# Patient Record
Sex: FEMALE | Race: BLACK OR AFRICAN AMERICAN | NOT HISPANIC OR LATINO | Employment: FULL TIME | URBAN - METROPOLITAN AREA
[De-identification: names, ages, dates, MRNs, and addresses within clinical notes are randomized per-mention and may not be internally consistent; named-entity substitution may affect disease eponyms.]

---

## 2023-04-11 ENCOUNTER — OFFICE VISIT (OUTPATIENT)
Dept: PRIMARY CARE | Facility: CLINIC | Age: 33
End: 2023-04-11
Payer: COMMERCIAL

## 2023-04-11 ENCOUNTER — LAB (OUTPATIENT)
Dept: LAB | Facility: LAB | Age: 33
End: 2023-04-11
Payer: COMMERCIAL

## 2023-04-11 VITALS — WEIGHT: 215 LBS | BODY MASS INDEX: 39.32 KG/M2 | DIASTOLIC BLOOD PRESSURE: 80 MMHG | SYSTOLIC BLOOD PRESSURE: 130 MMHG

## 2023-04-11 DIAGNOSIS — F32.A DEPRESSION, UNSPECIFIED DEPRESSION TYPE: ICD-10-CM

## 2023-04-11 DIAGNOSIS — R07.9 CHEST PAIN, UNSPECIFIED TYPE: Primary | ICD-10-CM

## 2023-04-11 DIAGNOSIS — I10 HYPERTENSION, UNSPECIFIED TYPE: ICD-10-CM

## 2023-04-11 DIAGNOSIS — I10 HYPERTENSION, UNSPECIFIED TYPE: Primary | ICD-10-CM

## 2023-04-11 LAB
ALANINE AMINOTRANSFERASE (SGPT) (U/L) IN SER/PLAS: 14 U/L (ref 7–45)
ALBUMIN (G/DL) IN SER/PLAS: 4.3 G/DL (ref 3.4–5)
ALKALINE PHOSPHATASE (U/L) IN SER/PLAS: 80 U/L (ref 33–110)
ANION GAP IN SER/PLAS: 12 MMOL/L (ref 10–20)
ASPARTATE AMINOTRANSFERASE (SGOT) (U/L) IN SER/PLAS: 15 U/L (ref 9–39)
BILIRUBIN TOTAL (MG/DL) IN SER/PLAS: 0.5 MG/DL (ref 0–1.2)
CALCIDIOL (25 OH VITAMIN D3) (NG/ML) IN SER/PLAS: 25 NG/ML
CALCIUM (MG/DL) IN SER/PLAS: 9.7 MG/DL (ref 8.6–10.6)
CARBON DIOXIDE, TOTAL (MMOL/L) IN SER/PLAS: 30 MMOL/L (ref 21–32)
CHLORIDE (MMOL/L) IN SER/PLAS: 102 MMOL/L (ref 98–107)
CHOLESTEROL (MG/DL) IN SER/PLAS: 177 MG/DL (ref 0–199)
CHOLESTEROL IN HDL (MG/DL) IN SER/PLAS: 70.2 MG/DL
CHOLESTEROL/HDL RATIO: 2.5
CREATININE (MG/DL) IN SER/PLAS: 0.79 MG/DL (ref 0.5–1.05)
ERYTHROCYTE DISTRIBUTION WIDTH (RATIO) BY AUTOMATED COUNT: 15.2 % (ref 11.5–14.5)
ERYTHROCYTE MEAN CORPUSCULAR HEMOGLOBIN CONCENTRATION (G/DL) BY AUTOMATED: 30.2 G/DL (ref 32–36)
ERYTHROCYTE MEAN CORPUSCULAR VOLUME (FL) BY AUTOMATED COUNT: 79 FL (ref 80–100)
ERYTHROCYTES (10*6/UL) IN BLOOD BY AUTOMATED COUNT: 5.21 X10E12/L (ref 4–5.2)
ESTIMATED AVERAGE GLUCOSE FOR HBA1C: 117 MG/DL
GFR FEMALE: >90 ML/MIN/1.73M2
GLUCOSE (MG/DL) IN SER/PLAS: 94 MG/DL (ref 74–99)
HEMATOCRIT (%) IN BLOOD BY AUTOMATED COUNT: 41.1 % (ref 36–46)
HEMOGLOBIN (G/DL) IN BLOOD: 12.4 G/DL (ref 12–16)
HEMOGLOBIN A1C/HEMOGLOBIN TOTAL IN BLOOD: 5.7 %
LDL: 97 MG/DL (ref 0–99)
LEUKOCYTES (10*3/UL) IN BLOOD BY AUTOMATED COUNT: 6.2 X10E9/L (ref 4.4–11.3)
NRBC (PER 100 WBCS) BY AUTOMATED COUNT: 0 /100 WBC (ref 0–0)
PLATELETS (10*3/UL) IN BLOOD AUTOMATED COUNT: 439 X10E9/L (ref 150–450)
POTASSIUM (MMOL/L) IN SER/PLAS: 3.8 MMOL/L (ref 3.5–5.3)
PROTEIN TOTAL: 7.5 G/DL (ref 6.4–8.2)
SODIUM (MMOL/L) IN SER/PLAS: 140 MMOL/L (ref 136–145)
THYROTROPIN (MIU/L) IN SER/PLAS BY DETECTION LIMIT <= 0.05 MIU/L: 0.75 MIU/L (ref 0.44–3.98)
TRIGLYCERIDE (MG/DL) IN SER/PLAS: 51 MG/DL (ref 0–149)
UREA NITROGEN (MG/DL) IN SER/PLAS: 7 MG/DL (ref 6–23)
VLDL: 10 MG/DL (ref 0–40)

## 2023-04-11 PROCEDURE — 85027 COMPLETE CBC AUTOMATED: CPT

## 2023-04-11 PROCEDURE — 80061 LIPID PANEL: CPT

## 2023-04-11 PROCEDURE — 83036 HEMOGLOBIN GLYCOSYLATED A1C: CPT

## 2023-04-11 PROCEDURE — 3075F SYST BP GE 130 - 139MM HG: CPT | Performed by: INTERNAL MEDICINE

## 2023-04-11 PROCEDURE — 99214 OFFICE O/P EST MOD 30 MIN: CPT | Performed by: INTERNAL MEDICINE

## 2023-04-11 PROCEDURE — 93000 ELECTROCARDIOGRAM COMPLETE: CPT | Performed by: INTERNAL MEDICINE

## 2023-04-11 PROCEDURE — 84443 ASSAY THYROID STIM HORMONE: CPT

## 2023-04-11 PROCEDURE — 36415 COLL VENOUS BLD VENIPUNCTURE: CPT

## 2023-04-11 PROCEDURE — 80053 COMPREHEN METABOLIC PANEL: CPT

## 2023-04-11 PROCEDURE — 3079F DIAST BP 80-89 MM HG: CPT | Performed by: INTERNAL MEDICINE

## 2023-04-11 PROCEDURE — 82306 VITAMIN D 25 HYDROXY: CPT

## 2023-04-11 ASSESSMENT — ENCOUNTER SYMPTOMS
SLEEP DISTURBANCE: 1
RESPIRATORY NEGATIVE: 1
CARDIOVASCULAR NEGATIVE: 1
GASTROINTESTINAL NEGATIVE: 1
CONSTITUTIONAL NEGATIVE: 1
HYPERTENSION: 1

## 2023-04-11 NOTE — PATIENT INSTRUCTIONS
By optimizing your health through good nutrition, daily exercise, stress management, low/moderate alcohol and avoidance of tobacco, sugar and processed foods, you can help to decrease your risk of cardiovascular disease and stroke and achieve a healthy weight. A diet rich in whole, plant-based foods such as vegetables, beans, seeds, nuts, whole grains, healthy fats and fruit - leads to lower body mass index, blood pressure, HbA1C, and cholesterol levels.

## 2023-04-11 NOTE — PROGRESS NOTES
Subjective   Patient ID: Devora Arce is a 33 y.o. female who presents for Hypertension.  Hypertension        Review of Systems   Constitutional: Negative.    Respiratory: Negative.     Cardiovascular: Negative.    Gastrointestinal: Negative.    Psychiatric/Behavioral:  Positive for sleep disturbance and suicidal ideas.        Objective   Physical Exam  Constitutional:       Appearance: She is well-developed.   Cardiovascular:      Rate and Rhythm: Normal rate and regular rhythm.      Heart sounds: Normal heart sounds. No murmur heard.  Pulmonary:      Effort: Pulmonary effort is normal.      Breath sounds: Normal breath sounds.   Abdominal:      General: Bowel sounds are normal.      Palpations: Abdomen is soft.         Assessment/Plan   Problem List Items Addressed This Visit          Circulatory    Hypertension - Primary    Relevant Orders    CBC    Comprehensive Metabolic Panel    TSH with reflex to Free T4 if abnormal    Hemoglobin A1C    Lipid Panel    Vitamin D, Total       Other    Depression    Relevant Orders    CBC    Comprehensive Metabolic Panel    TSH with reflex to Free T4 if abnormal    Hemoglobin A1C    Lipid Panel    Vitamin D, Total     HTN  Checks BP at home with wrist machine and getting random redings.  From 70 systolic to 200 systolic.  Get an arm machine and start checking it BID, get a large cuff.  She is under a lot of stress, mostly related to finances.  Her depression is poorly controlled, she will see her psychiatrist in 2 days.  No active suicidal plans.  No changes in meds at this time.  EKG showed normal sinus rhythm.  Get BW.  Follow up in 4 weeks.         Autumn Dangelo MD

## 2023-05-10 ENCOUNTER — OFFICE VISIT (OUTPATIENT)
Dept: PRIMARY CARE | Facility: CLINIC | Age: 33
End: 2023-05-10
Payer: COMMERCIAL

## 2023-05-10 VITALS
TEMPERATURE: 96.8 F | WEIGHT: 223 LBS | BODY MASS INDEX: 40.79 KG/M2 | DIASTOLIC BLOOD PRESSURE: 80 MMHG | SYSTOLIC BLOOD PRESSURE: 120 MMHG

## 2023-05-10 DIAGNOSIS — I10 HYPERTENSION, UNSPECIFIED TYPE: Primary | ICD-10-CM

## 2023-05-10 DIAGNOSIS — E03.9 PRIMARY HYPOTHYROIDISM: Primary | ICD-10-CM

## 2023-05-10 PROBLEM — R93.89 ULTRASOUND SCAN ABNORMAL: Status: ACTIVE | Noted: 2023-05-10

## 2023-05-10 PROBLEM — E01.0 THYROMEGALY: Status: ACTIVE | Noted: 2023-05-10

## 2023-05-10 PROBLEM — M54.50 LOW BACK PAIN: Status: ACTIVE | Noted: 2023-05-10

## 2023-05-10 PROBLEM — R10.30 LOWER ABDOMINAL PAIN: Status: ACTIVE | Noted: 2023-05-10

## 2023-05-10 PROBLEM — D21.9 FIBROID: Status: ACTIVE | Noted: 2023-05-10

## 2023-05-10 PROBLEM — R21 SKIN RASH: Status: ACTIVE | Noted: 2023-05-10

## 2023-05-10 PROBLEM — R19.4 CHANGE IN BOWEL HABITS: Status: ACTIVE | Noted: 2023-05-10

## 2023-05-10 PROBLEM — M25.572 LEFT ANKLE PAIN: Status: ACTIVE | Noted: 2023-05-10

## 2023-05-10 PROBLEM — R07.9 CHEST PAIN: Status: ACTIVE | Noted: 2023-05-10

## 2023-05-10 PROBLEM — R19.5 ABNORMAL STOOL COLOR: Status: ACTIVE | Noted: 2023-05-10

## 2023-05-10 PROBLEM — M25.619 SHOULDER JOINT STIFFNESS: Status: ACTIVE | Noted: 2023-05-10

## 2023-05-10 PROBLEM — B35.4 TINEA CORPORIS: Status: ACTIVE | Noted: 2023-05-10

## 2023-05-10 PROBLEM — K64.9 HEMORRHOIDS: Status: ACTIVE | Noted: 2023-05-10

## 2023-05-10 PROBLEM — E55.9 VITAMIN D DEFICIENCY: Status: ACTIVE | Noted: 2023-05-10

## 2023-05-10 PROBLEM — G56.03 BILATERAL CARPAL TUNNEL SYNDROME: Status: ACTIVE | Noted: 2023-05-10

## 2023-05-10 PROBLEM — R60.0 BILATERAL LEG EDEMA: Status: ACTIVE | Noted: 2023-05-10

## 2023-05-10 PROBLEM — F32.2 SEVERE DEPRESSION (MULTI): Status: ACTIVE | Noted: 2023-05-10

## 2023-05-10 PROBLEM — R42 VERTIGO: Status: ACTIVE | Noted: 2023-05-10

## 2023-05-10 PROBLEM — F41.8 OTHER SPECIFIED ANXIETY DISORDERS: Status: ACTIVE | Noted: 2023-05-10

## 2023-05-10 PROBLEM — N94.6 DYSMENORRHEA: Status: ACTIVE | Noted: 2023-05-10

## 2023-05-10 PROBLEM — M72.2 PLANTAR FASCIITIS: Status: ACTIVE | Noted: 2023-05-10

## 2023-05-10 PROBLEM — L25.9 CONTACT DERMATITIS: Status: ACTIVE | Noted: 2023-05-10

## 2023-05-10 PROBLEM — N85.2 ENLARGED UTERUS: Status: ACTIVE | Noted: 2023-05-10

## 2023-05-10 PROBLEM — N92.6 IRREGULAR MENSTRUAL CYCLE: Status: ACTIVE | Noted: 2023-05-10

## 2023-05-10 PROBLEM — R73.09 ELEVATED HEMOGLOBIN A1C: Status: ACTIVE | Noted: 2023-05-10

## 2023-05-10 PROBLEM — E87.6 HYPOKALEMIA: Status: ACTIVE | Noted: 2023-05-10

## 2023-05-10 PROBLEM — L72.3 SEBACEOUS CYST OF LEFT AXILLA: Status: ACTIVE | Noted: 2023-05-10

## 2023-05-10 PROBLEM — R45.851 SUICIDAL IDEATIONS: Status: ACTIVE | Noted: 2023-05-10

## 2023-05-10 PROBLEM — F43.10 STRESS DISORDER, POST TRAUMATIC: Status: ACTIVE | Noted: 2019-08-14

## 2023-05-10 PROBLEM — E66.9 OBESITY: Status: ACTIVE | Noted: 2023-05-10

## 2023-05-10 PROBLEM — N28.1 KIDNEY CYST, ACQUIRED: Status: ACTIVE | Noted: 2023-05-10

## 2023-05-10 PROBLEM — D64.9 ANEMIA: Status: ACTIVE | Noted: 2023-05-10

## 2023-05-10 PROBLEM — R19.5 LOOSE STOOLS: Status: ACTIVE | Noted: 2023-05-10

## 2023-05-10 PROBLEM — N94.3 PMS (PREMENSTRUAL SYNDROME): Status: ACTIVE | Noted: 2023-05-10

## 2023-05-10 PROBLEM — J30.9 ALLERGIC RHINITIS: Status: ACTIVE | Noted: 2023-05-10

## 2023-05-10 PROCEDURE — 3079F DIAST BP 80-89 MM HG: CPT | Performed by: INTERNAL MEDICINE

## 2023-05-10 PROCEDURE — 3074F SYST BP LT 130 MM HG: CPT | Performed by: INTERNAL MEDICINE

## 2023-05-10 PROCEDURE — 99214 OFFICE O/P EST MOD 30 MIN: CPT | Performed by: INTERNAL MEDICINE

## 2023-05-10 RX ORDER — LEVOTHYROXINE SODIUM 50 UG/1
1 TABLET ORAL DAILY
COMMUNITY
Start: 2021-06-14 | End: 2023-05-10 | Stop reason: SDUPTHER

## 2023-05-10 RX ORDER — AMLODIPINE BESYLATE 10 MG/1
10 TABLET ORAL DAILY
COMMUNITY
End: 2023-05-10 | Stop reason: SDUPTHER

## 2023-05-10 RX ORDER — LEVOTHYROXINE SODIUM 50 UG/1
TABLET ORAL
Qty: 30 TABLET | Refills: 0 | Status: SHIPPED | OUTPATIENT
Start: 2023-05-10 | End: 2023-05-10 | Stop reason: SDUPTHER

## 2023-05-10 RX ORDER — AMLODIPINE BESYLATE 10 MG/1
10 TABLET ORAL DAILY
Qty: 90 TABLET | Refills: 3 | Status: SHIPPED | OUTPATIENT
Start: 2023-05-10 | End: 2023-11-01 | Stop reason: SDUPTHER

## 2023-05-10 RX ORDER — ESCITALOPRAM OXALATE 5 MG/1
1 TABLET ORAL DAILY
COMMUNITY
Start: 2022-04-20 | End: 2023-11-01 | Stop reason: SDUPTHER

## 2023-05-10 RX ORDER — BUPROPION HYDROCHLORIDE 300 MG/1
1 TABLET ORAL DAILY
COMMUNITY
Start: 2019-05-07 | End: 2023-11-01 | Stop reason: SDUPTHER

## 2023-05-10 RX ORDER — HYDROCHLOROTHIAZIDE 12.5 MG/1
12.5 TABLET ORAL DAILY
Qty: 30 TABLET | Refills: 0 | Status: SHIPPED | OUTPATIENT
Start: 2023-05-10 | End: 2023-06-20 | Stop reason: ALTCHOICE

## 2023-05-10 RX ORDER — HYDROXYZINE HYDROCHLORIDE 25 MG/1
TABLET, FILM COATED ORAL
COMMUNITY
Start: 2022-11-08 | End: 2023-11-01 | Stop reason: SDUPTHER

## 2023-05-10 RX ORDER — LEVOTHYROXINE SODIUM 50 UG/1
50 TABLET ORAL DAILY
Qty: 90 TABLET | Refills: 3 | Status: SHIPPED | OUTPATIENT
Start: 2023-05-10 | End: 2023-11-01 | Stop reason: SDUPTHER

## 2023-05-10 ASSESSMENT — PATIENT HEALTH QUESTIONNAIRE - PHQ9
1. LITTLE INTEREST OR PLEASURE IN DOING THINGS: MORE THAN HALF THE DAYS
SUM OF ALL RESPONSES TO PHQ9 QUESTIONS 1 AND 2: 4
2. FEELING DOWN, DEPRESSED OR HOPELESS: MORE THAN HALF THE DAYS

## 2023-05-10 ASSESSMENT — ENCOUNTER SYMPTOMS: HYPERTENSION: 1

## 2023-05-10 NOTE — PROGRESS NOTES
Subjective   Patient ID: Devora Arce is a 33 y.o. female who presents for Follow-up (BP).  Hypertension            Objective   Physical Exam  Vitals reviewed.   Constitutional:       Appearance: Normal appearance. She is well-developed.   Eyes:      Extraocular Movements: Extraocular movements intact.      Conjunctiva/sclera: Conjunctivae normal.      Pupils: Pupils are equal, round, and reactive to light.   Cardiovascular:      Rate and Rhythm: Normal rate and regular rhythm.      Heart sounds: Normal heart sounds. No murmur heard.  Pulmonary:      Effort: Pulmonary effort is normal.      Breath sounds: Normal breath sounds.   Abdominal:      General: Bowel sounds are normal.      Palpations: Abdomen is soft.   Neurological:      Mental Status: She is alert.         Assessment/Plan   Problem List Items Addressed This Visit          Circulatory    Hypertension - Primary    Relevant Medications    amLODIPine (Norvasc) 10 mg tablet    levothyroxine (Synthroid, Levoxyl) 50 mcg tablet    hydroCHLOROthiazide (HYDRODiuril) 12.5 mg tablet     HTN  Checks BP at home with wrist machine and getting random redings.  From 70 systolic to 200 systolic.  Get an arm machine and start checking it BID, get a large cuff.  She is under a lot of stress, mostly related to finances.  Her depression is poorly controlled, she will see her psychiatrist in 2 days.  No active suicidal plans.  No changes in meds at this time.  EKG showed normal sinus rhythm.  Get BW.  Follow up in 4 weeks.         Blanquita Alvarenga MD

## 2023-05-10 NOTE — PROGRESS NOTES
I reviewed with the resident the medical history and the resident’s findings on physical examination.  I discussed with the resident the patient’s diagnosis and concur with the treatment plan as documented in the resident note.     I saw and evaluated the patient. I personally obtained the key and critical portions of the history and physical exam or was physically present for key and critical portions performed by the trainee. I reviewed the trainee's documentation and discussed the patient with the trainee. I agree with the trainee's medical decision making, as documented on the trainee's note.     Autumn Dangelo MD

## 2023-05-10 NOTE — PROGRESS NOTES
Chief Complaint:   Chief Complaint   Patient presents with    Follow-up     BP      Subjective     HPI    33 y.o. female with a PMH significant for hypertension, depression and anxiety, obesity presents to the clinic for elevated BP follow up. Measurements taken at home showed consistently elevated blood pressures. She denies HA, dizziness, SOB, CP or visual disturbances. She denies having started or changed  new medications and is taking amlodipine 10 mg conssitently. Recent thyroid function tests were WNL.     ROS   Review of Systems   All other systems reviewed and are negative.       Objective    Visit Vitals  /80   Temp 36 °C (96.8 °F)      BMI Readings from Last 15 Encounters:   05/10/23 40.79 kg/m²   04/11/23 39.32 kg/m²   03/06/23 39.51 kg/m²   09/14/22 40.42 kg/m²   08/12/22 40.64 kg/m²   06/15/22 41.35 kg/m²   02/28/22 41.15 kg/m²   01/14/22 41.34 kg/m²   10/08/21 40.79 kg/m²   02/19/21 39.69 kg/m²   02/03/21 40.63 kg/m²   12/10/20 40.24 kg/m²   09/24/20 40.60 kg/m²   09/01/20 40.60 kg/m²      Lab Results   Component Value Date    HGBA1C 5.7 (A) 04/11/2023      Lab Results   Component Value Date    HGBA1C 5.7 (A) 04/11/2023    HGBA1C 5.7 (A) 09/28/2022    HGBA1C 5.6 01/24/2022     Lab Results   Component Value Date    GLUF 92 08/14/2020    CREATININE 0.79 04/11/2023      Lab Results   Component Value Date    WBC 6.2 04/11/2023    HGB 12.4 04/11/2023    HCT 41.1 04/11/2023    MCV 79 (L) 04/11/2023     04/11/2023        Chemistry    Lab Results   Component Value Date/Time     04/11/2023 1503    K 3.8 04/11/2023 1503     04/11/2023 1503    CO2 30 04/11/2023 1503    BUN 7 04/11/2023 1503    CREATININE 0.79 04/11/2023 1503    Lab Results   Component Value Date/Time    CALCIUM 9.7 04/11/2023 1503    ALKPHOS 80 04/11/2023 1503    AST 15 04/11/2023 1503    ALT 14 04/11/2023 1503    BILITOT 0.5 04/11/2023 1503             Physical Exam  Physical Exam  Vitals reviewed.   Constitutional:        Appearance: Normal appearance.   Eyes:      Extraocular Movements: Extraocular movements intact.      Conjunctiva/sclera: Conjunctivae normal.      Pupils: Pupils are equal, round, and reactive to light.   Cardiovascular:      Rate and Rhythm: Normal rate and regular rhythm.   Pulmonary:      Effort: Pulmonary effort is normal.      Breath sounds: Normal breath sounds.   Abdominal:      General: Bowel sounds are normal.      Palpations: Abdomen is soft.   Neurological:      Mental Status: She is alert.          Assessment/Plan    The following medical issues were discussed during this encounter  :     # Elevated blood pressure     - BP measurements  taken at home were consistently elevated but varied in severity both  systolic and diastolic likely due to measurements taken at different times of the day or medication being taken at different times.   - Readings taken in the office were WNL, 1st reading 120/80; 2nd reading 127/80   - Added hydrochlorothiazide 12.5 to be taken in the morning  - Amlodipine 10 mg to be taken in the evening   -  Advised patient to record BP consitently and  twice a day   - Return in 6 weeks for BP check         The following labs were ordered to be completed before the patient's next visit: UTD     Please return in 6 weeks or if symptoms worsen     Blanquita Alvarenga MD

## 2023-05-10 NOTE — PATIENT INSTRUCTIONS
Please take hydrochlorothiazide 12.5 mg in the morning and amlodipine 10 mg in the evening.   Please measure BP twice a day   Follow up in 6 weeks

## 2023-06-20 ENCOUNTER — TELEMEDICINE (OUTPATIENT)
Dept: PRIMARY CARE | Facility: CLINIC | Age: 33
End: 2023-06-20
Payer: COMMERCIAL

## 2023-06-20 ENCOUNTER — APPOINTMENT (OUTPATIENT)
Dept: PRIMARY CARE | Facility: CLINIC | Age: 33
End: 2023-06-20
Payer: COMMERCIAL

## 2023-06-20 DIAGNOSIS — R73.09 ELEVATED HEMOGLOBIN A1C: ICD-10-CM

## 2023-06-20 DIAGNOSIS — F32.A DEPRESSION, UNSPECIFIED DEPRESSION TYPE: ICD-10-CM

## 2023-06-20 DIAGNOSIS — I10 BENIGN ESSENTIAL HTN: Primary | ICD-10-CM

## 2023-06-20 PROCEDURE — 99214 OFFICE O/P EST MOD 30 MIN: CPT | Performed by: INTERNAL MEDICINE

## 2023-06-20 RX ORDER — LOSARTAN POTASSIUM AND HYDROCHLOROTHIAZIDE 12.5; 5 MG/1; MG/1
1 TABLET ORAL DAILY
Qty: 30 TABLET | Refills: 3 | Status: SHIPPED | OUTPATIENT
Start: 2023-06-20 | End: 2023-08-25 | Stop reason: ALTCHOICE

## 2023-06-20 ASSESSMENT — ENCOUNTER SYMPTOMS
ORTHOPNEA: 0
SWEATS: 0
HYPERTENSION: 1
PND: 0
PALPITATIONS: 0
HEADACHES: 1
BLURRED VISION: 0
SHORTNESS OF BREATH: 0
NECK PAIN: 0

## 2023-06-20 NOTE — PROGRESS NOTES
Subjective   Patient ID: Devora Arce is a 33 y.o. female who presents for Follow-up.  Hypertension  This is a chronic problem. The current episode started more than 1 year ago. The problem has been waxing and waning since onset. The problem is resistant. Associated symptoms include anxiety and headaches. Pertinent negatives include no blurred vision, chest pain, malaise/fatigue, neck pain, orthopnea, palpitations, peripheral edema, PND, shortness of breath or sweats. Agents associated with hypertension include NSAIDs and thyroid hormones. Risk factors for coronary artery disease include obesity, sedentary lifestyle and stress. Compliance problems include diet and psychosocial issues.        Review of Systems   Constitutional:  Negative for malaise/fatigue.   Eyes:  Negative for blurred vision.   Respiratory:  Negative for shortness of breath.    Cardiovascular:  Negative for chest pain, palpitations, orthopnea and PND.   Musculoskeletal:  Negative for neck pain.   Neurological:  Positive for headaches.       Objective   Physical Exam  Neurological:      Mental Status: She is alert.   Psychiatric:         Mood and Affect: Mood normal.         Assessment/Plan   Problem List Items Addressed This Visit       Depression    Benign essential HTN - Primary    Relevant Medications    losartan-hydrochlorothiazide (Hyzaar) 50-12.5 mg tablet    Elevated hemoglobin A1c        HTN  Not well controlled  On Amlodipine and hydrochlorothiazide  Start Losartan/hydrochlorothiazide in the morning  Take Amlodipine in the evening    Treatment resistant depression  She is on ketamine infusions for it  Her BP was high before therapy and the suggested another medication    Follow up in 3 months  An interactive audio and video telecommunication system which permits real time communications between the patient (at the originating site) and provider (at the distant site) was utilized to provide this telehealth service.    Verbal consent was  requested and obtained from the patient on the day of encounter.      Autumn Dangelo MD

## 2023-08-25 ENCOUNTER — OFFICE VISIT (OUTPATIENT)
Dept: PRIMARY CARE | Facility: CLINIC | Age: 33
End: 2023-08-25
Payer: COMMERCIAL

## 2023-08-25 VITALS — SYSTOLIC BLOOD PRESSURE: 144 MMHG | WEIGHT: 226 LBS | BODY MASS INDEX: 41.34 KG/M2 | DIASTOLIC BLOOD PRESSURE: 100 MMHG

## 2023-08-25 DIAGNOSIS — I10 BENIGN ESSENTIAL HTN: Primary | ICD-10-CM

## 2023-08-25 DIAGNOSIS — L50.9 HIVES: ICD-10-CM

## 2023-08-25 PROCEDURE — 3080F DIAST BP >= 90 MM HG: CPT | Performed by: INTERNAL MEDICINE

## 2023-08-25 PROCEDURE — 99214 OFFICE O/P EST MOD 30 MIN: CPT | Performed by: INTERNAL MEDICINE

## 2023-08-25 PROCEDURE — 3077F SYST BP >= 140 MM HG: CPT | Performed by: INTERNAL MEDICINE

## 2023-08-25 RX ORDER — CHLORTHALIDONE 25 MG/1
25 TABLET ORAL DAILY
Qty: 30 TABLET | Refills: 2 | Status: SHIPPED | OUTPATIENT
Start: 2023-08-25 | End: 2023-11-01 | Stop reason: SDUPTHER

## 2023-08-25 RX ORDER — MINERAL OIL
180 ENEMA (ML) RECTAL DAILY
Qty: 30 TABLET | Refills: 0 | Status: SHIPPED | OUTPATIENT
Start: 2023-08-25 | End: 2024-08-24

## 2023-08-25 ASSESSMENT — ENCOUNTER SYMPTOMS
DIARRHEA: 0
ANOREXIA: 0
URTICARIA: 1
EYE PAIN: 0

## 2023-08-25 ASSESSMENT — PATIENT HEALTH QUESTIONNAIRE - PHQ9
2. FEELING DOWN, DEPRESSED OR HOPELESS: MORE THAN HALF THE DAYS
SUM OF ALL RESPONSES TO PHQ9 QUESTIONS 1 AND 2: 4
1. LITTLE INTEREST OR PLEASURE IN DOING THINGS: MORE THAN HALF THE DAYS

## 2023-08-25 NOTE — PROGRESS NOTES
Subjective   Patient ID: Devora Arce is a 33 y.o. female who presents for Follow-up and Hives.  Hives  This is a new problem. The current episode started more than 1 month ago. The problem has been gradually worsening since onset. The affected locations include the head and neck. The rash is characterized by dryness and itchiness. She was exposed to a new medication. Pertinent negatives include no anorexia, congestion, diarrhea, eye pain or facial edema.       Review of Systems   HENT:  Negative for congestion.    Eyes:  Negative for pain.   Gastrointestinal:  Negative for anorexia and diarrhea.       Objective   Physical Exam    There are dry patches on the base of the scalp and upper neck area  Patches are dry to touch and somewhat scaly      Assessment/Plan   Problem List Items Addressed This Visit       Benign essential HTN - Primary    Relevant Medications    chlorthalidone (Hygroton) 25 mg tablet     Other Visit Diagnoses       Hives        Relevant Medications    fexofenadine (Allegra) 180 mg tablet    Other Relevant Orders    Referral to Allergy          She may have had a reaction to Losartan  About 1 week after she started the medication, she noticed itching in her neck area  She denies using any new body products, no new shampoos, no new necklesses, no new perfumes.    She has no new animal exposure.    We will stop Losartan.  Start Chlorthalidone in am and Amlodipine at night.  Start Allegra daily.  The rash should improved in 7-14 days after medication discontinuation.  If doesn't, see an allergy specialist.           Autumn Dangelo MD

## 2023-09-29 ENCOUNTER — TELEMEDICINE (OUTPATIENT)
Dept: PRIMARY CARE | Facility: CLINIC | Age: 33
End: 2023-09-29
Payer: COMMERCIAL

## 2023-09-29 DIAGNOSIS — I10 BENIGN ESSENTIAL HTN: Primary | ICD-10-CM

## 2023-09-29 PROCEDURE — 99213 OFFICE O/P EST LOW 20 MIN: CPT | Performed by: INTERNAL MEDICINE

## 2023-09-29 ASSESSMENT — ENCOUNTER SYMPTOMS
CONSTITUTIONAL NEGATIVE: 1
RESPIRATORY NEGATIVE: 1
CARDIOVASCULAR NEGATIVE: 1
GASTROINTESTINAL NEGATIVE: 1

## 2023-09-29 NOTE — PROGRESS NOTES
Subjective   Patient ID: Devora Arce is a 33 y.o. female who presents for Follow-up.  HPI    Review of Systems   Constitutional: Negative.    Respiratory: Negative.     Cardiovascular: Negative.    Gastrointestinal: Negative.        Objective   Physical Exam  Neurological:      Mental Status: She is alert.   Psychiatric:         Mood and Affect: Mood normal.         Assessment/Plan   Problem List Items Addressed This Visit       Benign essential HTN - Primary     Hives are much better  She is seeing derm today  Hives are almost gone  She is tolerating Chlorthalidone well, no issues  Continue with current  Monitor BP at home daily  An interactive audio and video telecommunication system which permits real time communications between the patient (at the originating site) and provider (at the distant site) was utilized to provide this telehealth service.    Verbal consent was requested and obtained from the patient on the day of encounter.    Scheduled for physical next month         Autumn Dangelo MD

## 2023-10-27 ENCOUNTER — APPOINTMENT (OUTPATIENT)
Dept: PRIMARY CARE | Facility: CLINIC | Age: 33
End: 2023-10-27
Payer: COMMERCIAL

## 2023-11-01 ENCOUNTER — LAB (OUTPATIENT)
Dept: LAB | Facility: LAB | Age: 33
End: 2023-11-01
Payer: COMMERCIAL

## 2023-11-01 ENCOUNTER — OFFICE VISIT (OUTPATIENT)
Dept: PRIMARY CARE | Facility: CLINIC | Age: 33
End: 2023-11-01
Payer: COMMERCIAL

## 2023-11-01 VITALS
BODY MASS INDEX: 38.64 KG/M2 | HEIGHT: 62 IN | HEART RATE: 82 BPM | DIASTOLIC BLOOD PRESSURE: 86 MMHG | SYSTOLIC BLOOD PRESSURE: 135 MMHG | WEIGHT: 210 LBS

## 2023-11-01 DIAGNOSIS — E03.9 PRIMARY HYPOTHYROIDISM: ICD-10-CM

## 2023-11-01 DIAGNOSIS — Z11.59 ENCOUNTER FOR HEPATITIS C SCREENING TEST FOR LOW RISK PATIENT: ICD-10-CM

## 2023-11-01 DIAGNOSIS — D64.9 ANEMIA, UNSPECIFIED TYPE: ICD-10-CM

## 2023-11-01 DIAGNOSIS — E55.9 VITAMIN D DEFICIENCY: ICD-10-CM

## 2023-11-01 DIAGNOSIS — F32.9 MAJOR DEPRESSIVE DISORDER WITH CURRENT ACTIVE EPISODE, UNSPECIFIED DEPRESSION EPISODE SEVERITY, UNSPECIFIED WHETHER RECURRENT: ICD-10-CM

## 2023-11-01 DIAGNOSIS — Z23 NEED FOR TDAP VACCINATION: ICD-10-CM

## 2023-11-01 DIAGNOSIS — I10 BENIGN ESSENTIAL HTN: Primary | ICD-10-CM

## 2023-11-01 DIAGNOSIS — R73.03 PREDIABETES: ICD-10-CM

## 2023-11-01 DIAGNOSIS — I10 BENIGN ESSENTIAL HTN: ICD-10-CM

## 2023-11-01 DIAGNOSIS — Z11.4 SCREENING FOR HIV WITHOUT PRESENCE OF RISK FACTORS: ICD-10-CM

## 2023-11-01 DIAGNOSIS — F41.1 GAD (GENERALIZED ANXIETY DISORDER): ICD-10-CM

## 2023-11-01 DIAGNOSIS — Z23 NEED FOR VACCINE FOR TD (TETANUS-DIPHTHERIA): ICD-10-CM

## 2023-11-01 DIAGNOSIS — E56.9 HYPOVITAMINOSIS: ICD-10-CM

## 2023-11-01 LAB
ALBUMIN SERPL BCP-MCNC: 4 G/DL (ref 3.4–5)
ALP SERPL-CCNC: 79 U/L (ref 33–110)
ALT SERPL W P-5'-P-CCNC: 14 U/L (ref 7–45)
ANION GAP SERPL CALC-SCNC: 17 MMOL/L (ref 10–20)
AST SERPL W P-5'-P-CCNC: 15 U/L (ref 9–39)
BILIRUB SERPL-MCNC: 0.3 MG/DL (ref 0–1.2)
BUN SERPL-MCNC: 17 MG/DL (ref 6–23)
CALCIUM SERPL-MCNC: 9.7 MG/DL (ref 8.6–10.6)
CHLORIDE SERPL-SCNC: 99 MMOL/L (ref 98–107)
CO2 SERPL-SCNC: 28 MMOL/L (ref 21–32)
CREAT SERPL-MCNC: 1.26 MG/DL (ref 0.5–1.05)
FERRITIN SERPL-MCNC: 50 NG/ML (ref 8–150)
GFR SERPL CREATININE-BSD FRML MDRD: 59 ML/MIN/1.73M*2
GLUCOSE SERPL-MCNC: 87 MG/DL (ref 74–99)
IRON SATN MFR SERPL: 9 % (ref 25–45)
IRON SERPL-MCNC: 31 UG/DL (ref 35–150)
POTASSIUM SERPL-SCNC: 3.4 MMOL/L (ref 3.5–5.3)
PROT SERPL-MCNC: 7.5 G/DL (ref 6.4–8.2)
SODIUM SERPL-SCNC: 141 MMOL/L (ref 136–145)
TIBC SERPL-MCNC: 361 UG/DL (ref 240–445)
UIBC SERPL-MCNC: 330 UG/DL (ref 110–370)

## 2023-11-01 PROCEDURE — 90471 IMMUNIZATION ADMIN: CPT | Performed by: INTERNAL MEDICINE

## 2023-11-01 PROCEDURE — 85027 COMPLETE CBC AUTOMATED: CPT

## 2023-11-01 PROCEDURE — 82043 UR ALBUMIN QUANTITATIVE: CPT

## 2023-11-01 PROCEDURE — 82306 VITAMIN D 25 HYDROXY: CPT

## 2023-11-01 PROCEDURE — 3079F DIAST BP 80-89 MM HG: CPT | Performed by: INTERNAL MEDICINE

## 2023-11-01 PROCEDURE — 83540 ASSAY OF IRON: CPT

## 2023-11-01 PROCEDURE — 87389 HIV-1 AG W/HIV-1&-2 AB AG IA: CPT

## 2023-11-01 PROCEDURE — 99395 PREV VISIT EST AGE 18-39: CPT | Performed by: INTERNAL MEDICINE

## 2023-11-01 PROCEDURE — 3075F SYST BP GE 130 - 139MM HG: CPT | Performed by: INTERNAL MEDICINE

## 2023-11-01 PROCEDURE — 36415 COLL VENOUS BLD VENIPUNCTURE: CPT

## 2023-11-01 PROCEDURE — 82607 VITAMIN B-12: CPT

## 2023-11-01 PROCEDURE — 90715 TDAP VACCINE 7 YRS/> IM: CPT | Performed by: INTERNAL MEDICINE

## 2023-11-01 PROCEDURE — 83036 HEMOGLOBIN GLYCOSYLATED A1C: CPT

## 2023-11-01 PROCEDURE — 80053 COMPREHEN METABOLIC PANEL: CPT

## 2023-11-01 PROCEDURE — 82570 ASSAY OF URINE CREATININE: CPT

## 2023-11-01 PROCEDURE — 86803 HEPATITIS C AB TEST: CPT

## 2023-11-01 PROCEDURE — 83550 IRON BINDING TEST: CPT

## 2023-11-01 PROCEDURE — 82728 ASSAY OF FERRITIN: CPT

## 2023-11-01 RX ORDER — ESCITALOPRAM OXALATE 5 MG/1
5 TABLET ORAL DAILY
Qty: 90 TABLET | Refills: 3 | Status: SHIPPED | OUTPATIENT
Start: 2023-11-01

## 2023-11-01 RX ORDER — AMLODIPINE BESYLATE 10 MG/1
10 TABLET ORAL DAILY
Qty: 90 TABLET | Refills: 3 | Status: SHIPPED | OUTPATIENT
Start: 2023-11-01 | End: 2024-06-04 | Stop reason: SDUPTHER

## 2023-11-01 RX ORDER — HYDROXYZINE HYDROCHLORIDE 25 MG/1
25 TABLET, FILM COATED ORAL DAILY PRN
Qty: 90 TABLET | Refills: 3 | Status: SHIPPED | OUTPATIENT
Start: 2023-11-01

## 2023-11-01 RX ORDER — CHLORTHALIDONE 25 MG/1
25 TABLET ORAL DAILY
Qty: 90 TABLET | Refills: 3 | Status: SHIPPED | OUTPATIENT
Start: 2023-11-01 | End: 2024-06-04 | Stop reason: SDUPTHER

## 2023-11-01 RX ORDER — BUPROPION HYDROCHLORIDE 300 MG/1
300 TABLET ORAL DAILY
Qty: 90 TABLET | Refills: 3 | Status: SHIPPED | OUTPATIENT
Start: 2023-11-01

## 2023-11-01 RX ORDER — LEVOTHYROXINE SODIUM 50 UG/1
50 TABLET ORAL DAILY
Qty: 90 TABLET | Refills: 3 | Status: SHIPPED | OUTPATIENT
Start: 2023-11-01 | End: 2024-06-04 | Stop reason: SDUPTHER

## 2023-11-01 ASSESSMENT — ENCOUNTER SYMPTOMS
PALPITATIONS: 0
APPETITE CHANGE: 0
CONSTIPATION: 0
JOINT SWELLING: 1
MYALGIAS: 0
SINUS PRESSURE: 0
HEADACHES: 0
FATIGUE: 1
CHEST TIGHTNESS: 0
SHORTNESS OF BREATH: 0
CHOKING: 0
LIGHT-HEADEDNESS: 0
DIARRHEA: 0
COUGH: 0
FREQUENCY: 0
BLOOD IN STOOL: 0
DYSURIA: 0
SINUS PAIN: 0
ABDOMINAL DISTENTION: 0
BACK PAIN: 0
ACTIVITY CHANGE: 0
NUMBNESS: 0
RHINORRHEA: 1

## 2023-11-01 ASSESSMENT — PATIENT HEALTH QUESTIONNAIRE - PHQ9
SUM OF ALL RESPONSES TO PHQ9 QUESTIONS 1 AND 2: 4
1. LITTLE INTEREST OR PLEASURE IN DOING THINGS: MORE THAN HALF THE DAYS
2. FEELING DOWN, DEPRESSED OR HOPELESS: MORE THAN HALF THE DAYS

## 2023-11-01 NOTE — PROGRESS NOTES
I saw and evaluated the patient. I personally obtained the key and critical portions of the history and physical exam or was physically present for key and critical portions performed by the resident/fellow. I reviewed the resident/fellow's documentation and discussed the patient with the resident/fellow. I agree with the resident/fellow's medical decision making as documented in the note.    Autumn Dangelo MD

## 2023-11-01 NOTE — PROGRESS NOTES
Chief Complaint:   Chief Complaint   Patient presents with    Annual Exam      Subjective     HPI    33 y.o. female with a PMH significant for HTN, allergic rhinitis, hypothyroidism, vit D deficiency, MDD/RACHELL, carpal tunnel syndrome presents to the clinic for annual physical exam. No acute r active complaints otherwise    Shx: Denies smoking, denies drinking, uses CBD gummies to help with anxiety    ROS   Review of Systems   Constitutional:  Positive for fatigue. Negative for activity change and appetite change.   HENT:  Positive for postnasal drip and rhinorrhea. Negative for sinus pressure and sinus pain.    Respiratory:  Negative for cough, choking, chest tightness and shortness of breath.    Cardiovascular:  Positive for chest pain and leg swelling (Left ankle more swollen than right). Negative for palpitations.   Gastrointestinal:  Negative for abdominal distention, blood in stool, constipation and diarrhea.        Twice a day BM   Genitourinary:  Negative for dysuria, frequency and urgency.   Musculoskeletal:  Positive for joint swelling (left ankle). Negative for back pain and myalgias.   Neurological:  Negative for light-headedness, numbness and headaches.        Objective    Visit Vitals  /86   Pulse 82      BMI Readings from Last 15 Encounters:   11/01/23 38.41 kg/m²   08/25/23 41.34 kg/m²   05/10/23 40.79 kg/m²   05/02/23 39.14 kg/m²   04/11/23 39.32 kg/m²   03/06/23 39.51 kg/m²   09/14/22 40.42 kg/m²   08/12/22 40.64 kg/m²   06/15/22 41.35 kg/m²   02/28/22 41.15 kg/m²   01/14/22 41.34 kg/m²   10/08/21 40.79 kg/m²   02/19/21 39.69 kg/m²   02/03/21 40.63 kg/m²   01/22/21 41.05 kg/m²      Lab Results   Component Value Date    HGBA1C 5.7 (A) 04/11/2023      Lab Results   Component Value Date    HGBA1C 5.7 (A) 04/11/2023    HGBA1C 5.7 (A) 09/28/2022    HGBA1C 5.6 01/24/2022     Lab Results   Component Value Date    GLUF 92 08/14/2020    CREATININE 0.79 04/11/2023      Lab Results   Component Value Date     WBC 6.2 04/11/2023    HGB 12.4 04/11/2023    HCT 41.1 04/11/2023    MCV 79 (L) 04/11/2023     04/11/2023        Chemistry    Lab Results   Component Value Date/Time     04/11/2023 1503    K 3.8 04/11/2023 1503     04/11/2023 1503    CO2 30 04/11/2023 1503    BUN 7 04/11/2023 1503    CREATININE 0.79 04/11/2023 1503    Lab Results   Component Value Date/Time    CALCIUM 9.7 04/11/2023 1503    ALKPHOS 80 04/11/2023 1503    AST 15 04/11/2023 1503    ALT 14 04/11/2023 1503    BILITOT 0.5 04/11/2023 1503             Physical Exam  Physical Exam     Assessment/Plan    The following medical issues were discussed during this encounter: Devora was seen today for annual exam.    # Benign essential HTN (Primary)  -    Was started on losartan, patient developed hives from it  -     Was started on chlorthalidone, BP today was 135/86 mmHg  -     Ordered Comprehensive metabolic panel; Future  -     Ordered Albumin, urine, random; Future  -     Refilled amLODIPine (Norvasc) 10 mg tablet; Take 1 tablet (10 mg) by mouth once daily.  -     Refilled chlorthalidone (Hygroton) 25 mg tablet; Take 1 tablet (25 mg) by mouth once daily.    # Prediabetes  -    Last HbA1c 5.7; prediabetic  -     Ordered Hemoglobin A1c; Future    # Anemia, unspecified type  -     Endorses fatigue, last CBC remarkable for low MCV, MCHC and RDW. H/H WNL.  -    Ordered CBC; Future  -    Ordered Iron and TIBC; Future  -    Ordered Ferritin; Future    # Vitamin D deficiency  -   Last Vit D levels on 04/11/2023 were low 25. Patient was started on OTC Vit D 2000 U  -   Ordered Vitamin D 25-Hydroxy,Total (for eval of Vitamin D levels); Future    # Hypothyroidism:   -    Last TSH from 04/11/2023 0.75 (WNL)  -    Refilled levothyroxine (Synthroid, Levoxyl) 50 mcg tablet; Take 1 tablet (50 mcg) by mouth once daily.    # Major depressive disorder  # RACHELL (generalized anxiety disorder)  -     Patient on monthly ketamine infusions. Last infusion  administered on Monday 10/30  -    Patient endorses feeling better, has a behavioral woo provider tat she follows up with  -    Refilled buPROPion XL (Wellbutrin XL) 300 mg 24 hr tablet; Take 1 tablet (300 mg) by mouth once daily.  -    Refilled escitalopram (Lexapro) 5 mg tablet; Take 1 tablet (5 mg) by mouth once daily.  -    Refilled hydrOXYzine HCL (Atarax) 25 mg tablet; Take 1 tablet (25 mg) by mouth once daily as needed for anxiety.     Health maintenance  The following screening tests were ordered:  - Vitamin B12 levels  - HIV-1 and HIV-2 antibodies; Future  - Hepatitis C antibody; Future    Immunizations:  - Tdap administered today on 11/01/2023     Please return in 6 months or if symptoms worsen     Apolinar Valdez MD  Internal Medicine, PGY- 1  11/01/23 at 11:13 AM     Disclaimer: Documentation completed with the information available at the time of input. The times in the chart may not be reflective of actual patient care times, interventions, or procedures. Documentation occurs after the physical care of the patient.

## 2023-11-02 LAB
25(OH)D3 SERPL-MCNC: 26 NG/ML (ref 30–100)
CREAT UR-MCNC: 323.4 MG/DL (ref 20–320)
ERYTHROCYTE [DISTWIDTH] IN BLOOD BY AUTOMATED COUNT: 14.7 % (ref 11.5–14.5)
EST. AVERAGE GLUCOSE BLD GHB EST-MCNC: 111 MG/DL
HBA1C MFR BLD: 5.5 %
HCT VFR BLD AUTO: 39.8 % (ref 36–46)
HCV AB SER QL: NONREACTIVE
HGB BLD-MCNC: 11.9 G/DL (ref 12–16)
HIV 1+2 AB+HIV1 P24 AG SERPL QL IA: NONREACTIVE
MCH RBC QN AUTO: 24.9 PG (ref 26–34)
MCHC RBC AUTO-ENTMCNC: 29.9 G/DL (ref 32–36)
MCV RBC AUTO: 83 FL (ref 80–100)
MICROALBUMIN UR-MCNC: 16.7 MG/L
MICROALBUMIN/CREAT UR: 5.2 UG/MG CREAT
NRBC BLD-RTO: 0 /100 WBCS (ref 0–0)
PLATELET # BLD AUTO: 484 X10*3/UL (ref 150–450)
RBC # BLD AUTO: 4.78 X10*6/UL (ref 4–5.2)
VIT B12 SERPL-MCNC: 770 PG/ML (ref 211–911)
WBC # BLD AUTO: 5.6 X10*3/UL (ref 4.4–11.3)

## 2023-11-03 DIAGNOSIS — E87.6 HYPOKALEMIA: ICD-10-CM

## 2023-11-03 DIAGNOSIS — N17.9 AKI (ACUTE KIDNEY INJURY) (CMS-HCC): ICD-10-CM

## 2023-11-03 DIAGNOSIS — D50.0 IRON DEFICIENCY ANEMIA DUE TO CHRONIC BLOOD LOSS: ICD-10-CM

## 2023-11-03 DIAGNOSIS — E55.9 VITAMIN D DEFICIENCY: Primary | ICD-10-CM

## 2023-11-03 RX ORDER — POTASSIUM CHLORIDE 20 MEQ/1
20 TABLET, EXTENDED RELEASE ORAL DAILY
Qty: 2 TABLET | Refills: 0 | Status: SHIPPED | OUTPATIENT
Start: 2023-11-03 | End: 2023-11-05

## 2023-11-03 RX ORDER — FERROUS SULFATE 325(65) MG
325 TABLET, DELAYED RELEASE (ENTERIC COATED) ORAL
Qty: 90 TABLET | Refills: 1 | Status: SHIPPED | OUTPATIENT
Start: 2023-11-03 | End: 2024-11-02

## 2023-11-03 RX ORDER — ERGOCALCIFEROL 1.25 MG/1
50000 CAPSULE ORAL
Qty: 8 CAPSULE | Refills: 0 | Status: SHIPPED | OUTPATIENT
Start: 2023-11-03 | End: 2023-12-29

## 2023-11-07 ENCOUNTER — TELEPHONE (OUTPATIENT)
Dept: ALLERGY | Facility: CLINIC | Age: 33
End: 2023-11-07
Payer: COMMERCIAL

## 2023-11-13 ENCOUNTER — CLINICAL SUPPORT (OUTPATIENT)
Dept: ALLERGY | Facility: CLINIC | Age: 33
End: 2023-11-13
Payer: COMMERCIAL

## 2023-11-13 VITALS
DIASTOLIC BLOOD PRESSURE: 86 MMHG | BODY MASS INDEX: 39.2 KG/M2 | SYSTOLIC BLOOD PRESSURE: 130 MMHG | WEIGHT: 213 LBS | HEART RATE: 84 BPM | HEIGHT: 62 IN

## 2023-11-13 DIAGNOSIS — L23.89 ALLERGIC CONTACT DERMATITIS DUE TO OTHER AGENTS: Primary | ICD-10-CM

## 2023-11-13 DIAGNOSIS — R21 RASH: ICD-10-CM

## 2023-11-13 PROCEDURE — 99214 OFFICE O/P EST MOD 30 MIN: CPT | Performed by: ALLERGY & IMMUNOLOGY

## 2023-11-13 ASSESSMENT — PAIN SCALES - GENERAL: PAINLEVEL: 0-NO PAIN
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
## 2024-01-23 ENCOUNTER — APPOINTMENT (OUTPATIENT)
Dept: ALLERGY | Facility: CLINIC | Age: 34
End: 2024-01-23
Payer: COMMERCIAL

## 2024-01-25 ENCOUNTER — APPOINTMENT (OUTPATIENT)
Dept: ALLERGY | Facility: CLINIC | Age: 34
End: 2024-01-25
Payer: COMMERCIAL

## 2024-01-26 ENCOUNTER — APPOINTMENT (OUTPATIENT)
Dept: ALLERGY | Facility: CLINIC | Age: 34
End: 2024-01-26
Payer: COMMERCIAL

## 2024-02-07 ENCOUNTER — LAB (OUTPATIENT)
Dept: LAB | Facility: LAB | Age: 34
End: 2024-02-07
Payer: COMMERCIAL

## 2024-02-07 ENCOUNTER — OFFICE VISIT (OUTPATIENT)
Dept: PRIMARY CARE | Facility: CLINIC | Age: 34
End: 2024-02-07
Payer: COMMERCIAL

## 2024-02-07 VITALS
WEIGHT: 214 LBS | DIASTOLIC BLOOD PRESSURE: 84 MMHG | SYSTOLIC BLOOD PRESSURE: 120 MMHG | BODY MASS INDEX: 39.38 KG/M2 | HEIGHT: 62 IN

## 2024-02-07 DIAGNOSIS — I10 BENIGN ESSENTIAL HTN: Primary | ICD-10-CM

## 2024-02-07 DIAGNOSIS — F41.1 GAD (GENERALIZED ANXIETY DISORDER): ICD-10-CM

## 2024-02-07 DIAGNOSIS — R11.0 NAUSEA: ICD-10-CM

## 2024-02-07 DIAGNOSIS — F51.05 INSOMNIA DUE TO MENTAL CONDITION: ICD-10-CM

## 2024-02-07 DIAGNOSIS — F32.A DEPRESSION, UNSPECIFIED DEPRESSION TYPE: ICD-10-CM

## 2024-02-07 DIAGNOSIS — I10 BENIGN ESSENTIAL HTN: ICD-10-CM

## 2024-02-07 LAB
ALBUMIN SERPL BCP-MCNC: 4 G/DL (ref 3.4–5)
ALP SERPL-CCNC: 64 U/L (ref 33–110)
ALT SERPL W P-5'-P-CCNC: 13 U/L (ref 7–45)
ANION GAP SERPL CALC-SCNC: 15 MMOL/L (ref 10–20)
AST SERPL W P-5'-P-CCNC: 14 U/L (ref 9–39)
BILIRUB SERPL-MCNC: 0.3 MG/DL (ref 0–1.2)
BUN SERPL-MCNC: 20 MG/DL (ref 6–23)
CALCIUM SERPL-MCNC: 9.9 MG/DL (ref 8.6–10.6)
CHLORIDE SERPL-SCNC: 100 MMOL/L (ref 98–107)
CO2 SERPL-SCNC: 30 MMOL/L (ref 21–32)
CREAT SERPL-MCNC: 0.93 MG/DL (ref 0.5–1.05)
EGFRCR SERPLBLD CKD-EPI 2021: 84 ML/MIN/1.73M*2
GLUCOSE SERPL-MCNC: 87 MG/DL (ref 74–99)
POTASSIUM SERPL-SCNC: 3.6 MMOL/L (ref 3.5–5.3)
PROT SERPL-MCNC: 6.7 G/DL (ref 6.4–8.2)
SODIUM SERPL-SCNC: 141 MMOL/L (ref 136–145)

## 2024-02-07 PROCEDURE — 80053 COMPREHEN METABOLIC PANEL: CPT

## 2024-02-07 PROCEDURE — 99213 OFFICE O/P EST LOW 20 MIN: CPT | Performed by: INTERNAL MEDICINE

## 2024-02-07 PROCEDURE — 3074F SYST BP LT 130 MM HG: CPT | Performed by: INTERNAL MEDICINE

## 2024-02-07 PROCEDURE — 36415 COLL VENOUS BLD VENIPUNCTURE: CPT

## 2024-02-07 PROCEDURE — 1036F TOBACCO NON-USER: CPT | Performed by: INTERNAL MEDICINE

## 2024-02-07 PROCEDURE — 3079F DIAST BP 80-89 MM HG: CPT | Performed by: INTERNAL MEDICINE

## 2024-02-07 RX ORDER — CLOBETASOL PROPIONATE 0.5 MG/G
OINTMENT TOPICAL 2 TIMES DAILY
COMMUNITY
Start: 2023-10-27

## 2024-02-07 RX ORDER — CLONIDINE HYDROCHLORIDE 0.1 MG/1
0.1 TABLET ORAL 2 TIMES DAILY
COMMUNITY
Start: 2024-01-30

## 2024-02-07 RX ORDER — DEXTROMETHORPHAN HYDROBROMIDE, BUPROPION HYDROCHLORIDE 105; 45 MG/1; MG/1
1 TABLET, MULTILAYER, EXTENDED RELEASE ORAL DAILY
COMMUNITY
Start: 2024-01-25

## 2024-02-07 ASSESSMENT — PATIENT HEALTH QUESTIONNAIRE - PHQ9
1. LITTLE INTEREST OR PLEASURE IN DOING THINGS: NOT AT ALL
2. FEELING DOWN, DEPRESSED OR HOPELESS: SEVERAL DAYS
10. IF YOU CHECKED OFF ANY PROBLEMS, HOW DIFFICULT HAVE THESE PROBLEMS MADE IT FOR YOU TO DO YOUR WORK, TAKE CARE OF THINGS AT HOME, OR GET ALONG WITH OTHER PEOPLE: VERY DIFFICULT
SUM OF ALL RESPONSES TO PHQ9 QUESTIONS 1 AND 2: 1

## 2024-02-07 ASSESSMENT — ENCOUNTER SYMPTOMS
CONSTITUTIONAL NEGATIVE: 1
GASTROINTESTINAL NEGATIVE: 1
RESPIRATORY NEGATIVE: 1
CARDIOVASCULAR NEGATIVE: 1

## 2024-02-07 ASSESSMENT — LIFESTYLE VARIABLES: HOW MANY STANDARD DRINKS CONTAINING ALCOHOL DO YOU HAVE ON A TYPICAL DAY: PATIENT DOES NOT DRINK
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
## 2024-02-07 NOTE — PATIENT INSTRUCTIONS
It was nice to meet you today.    Continue counseling and follow up with psychiatry as planned.    Please complete blood work.     Call the office if symptoms persist or worsen.     Follow up as scheduled in March.

## 2024-02-15 PROBLEM — R11.0 NAUSEA: Status: ACTIVE | Noted: 2024-02-15

## 2024-02-15 PROBLEM — F41.1 GAD (GENERALIZED ANXIETY DISORDER): Status: ACTIVE | Noted: 2024-02-15

## 2024-02-15 PROBLEM — F51.05 INSOMNIA DUE TO MENTAL CONDITION: Status: ACTIVE | Noted: 2024-02-15

## 2024-02-17 PROCEDURE — 87635 SARS-COV-2 COVID-19 AMP PRB: CPT

## 2024-02-18 ENCOUNTER — LAB REQUISITION (OUTPATIENT)
Dept: LAB | Facility: HOSPITAL | Age: 34
End: 2024-02-18
Payer: COMMERCIAL

## 2024-02-18 LAB — SARS-COV-2 RNA RESP QL NAA+PROBE: DETECTED

## 2024-03-16 ENCOUNTER — E-VISIT (OUTPATIENT)
Dept: PRIMARY CARE | Facility: CLINIC | Age: 34
End: 2024-03-16
Payer: COMMERCIAL

## 2024-03-16 DIAGNOSIS — I10 BENIGN ESSENTIAL HTN: Primary | ICD-10-CM

## 2024-03-16 PROCEDURE — 99421 OL DIG E/M SVC 5-10 MIN: CPT | Performed by: INTERNAL MEDICINE

## 2024-03-16 RX ORDER — LOSARTAN POTASSIUM 50 MG/1
50 TABLET ORAL DAILY
Qty: 30 TABLET | Refills: 2 | Status: SHIPPED | OUTPATIENT
Start: 2024-03-16 | End: 2024-03-17

## 2024-03-17 DIAGNOSIS — I10 BENIGN ESSENTIAL HTN: Primary | ICD-10-CM

## 2024-03-17 RX ORDER — NEBIVOLOL 5 MG/1
5 TABLET ORAL DAILY
Qty: 30 TABLET | Refills: 2 | Status: SHIPPED | OUTPATIENT
Start: 2024-03-17 | End: 2024-06-04 | Stop reason: ALTCHOICE

## 2024-03-17 NOTE — TELEPHONE ENCOUNTER
We will start Bystolic 5 mg daily  The patient had an allergic reaction to Losartan in the past.  Time spent was 6 minutes for this encounter, including chart review, medication review, communication with the patient and addressing the concern.

## 2024-03-27 ENCOUNTER — OFFICE VISIT (OUTPATIENT)
Dept: PRIMARY CARE | Facility: CLINIC | Age: 34
End: 2024-03-27
Payer: COMMERCIAL

## 2024-03-27 VITALS — DIASTOLIC BLOOD PRESSURE: 80 MMHG | WEIGHT: 210 LBS | SYSTOLIC BLOOD PRESSURE: 130 MMHG | BODY MASS INDEX: 38.41 KG/M2

## 2024-03-27 DIAGNOSIS — Z00.00 ROUTINE GENERAL MEDICAL EXAMINATION AT A HEALTH CARE FACILITY: ICD-10-CM

## 2024-03-27 DIAGNOSIS — R42 VERTIGO: ICD-10-CM

## 2024-03-27 DIAGNOSIS — E56.9 HYPOVITAMINOSIS: ICD-10-CM

## 2024-03-27 DIAGNOSIS — D64.9 ANEMIA, UNSPECIFIED TYPE: ICD-10-CM

## 2024-03-27 DIAGNOSIS — R73.09 ELEVATED HEMOGLOBIN A1C: ICD-10-CM

## 2024-03-27 DIAGNOSIS — F32.A DEPRESSION, UNSPECIFIED DEPRESSION TYPE: ICD-10-CM

## 2024-03-27 DIAGNOSIS — E03.9 PRIMARY HYPOTHYROIDISM: ICD-10-CM

## 2024-03-27 DIAGNOSIS — S99.921A FOOT INJURY, RIGHT, INITIAL ENCOUNTER: ICD-10-CM

## 2024-03-27 DIAGNOSIS — I10 BENIGN ESSENTIAL HTN: Primary | ICD-10-CM

## 2024-03-27 PROCEDURE — 3075F SYST BP GE 130 - 139MM HG: CPT | Performed by: INTERNAL MEDICINE

## 2024-03-27 PROCEDURE — 3079F DIAST BP 80-89 MM HG: CPT | Performed by: INTERNAL MEDICINE

## 2024-03-27 PROCEDURE — 1036F TOBACCO NON-USER: CPT | Performed by: INTERNAL MEDICINE

## 2024-03-27 PROCEDURE — 99214 OFFICE O/P EST MOD 30 MIN: CPT | Performed by: INTERNAL MEDICINE

## 2024-03-27 RX ORDER — MECLIZINE HYDROCHLORIDE 25 MG/1
25 TABLET ORAL 3 TIMES DAILY PRN
Qty: 30 TABLET | Refills: 11 | Status: CANCELLED | OUTPATIENT
Start: 2024-03-27 | End: 2025-03-27

## 2024-03-27 ASSESSMENT — PATIENT HEALTH QUESTIONNAIRE - PHQ9
SUM OF ALL RESPONSES TO PHQ9 QUESTIONS 1 AND 2: 0
2. FEELING DOWN, DEPRESSED OR HOPELESS: NOT AT ALL
1. LITTLE INTEREST OR PLEASURE IN DOING THINGS: NOT AT ALL

## 2024-03-27 ASSESSMENT — ENCOUNTER SYMPTOMS
GASTROINTESTINAL NEGATIVE: 1
PALPITATIONS: 0
RESPIRATORY NEGATIVE: 1
CONSTIPATION: 0
CARDIOVASCULAR NEGATIVE: 1
CONSTITUTIONAL NEGATIVE: 1
NAUSEA: 0
VOMITING: 0
DIARRHEA: 0
ABDOMINAL DISTENTION: 0
ABDOMINAL PAIN: 0

## 2024-03-27 NOTE — PROGRESS NOTES
I reviewed the resident/fellow's documentation and discussed the patient with the resident/fellow. I agree with the resident/fellow's medical decision making as documented in the note.  As the attending physician, I certify that I personally reviewed the patient's history and personally examined the patient to confirm the physical findings described above, and that I reviewed the relevant imaging studies and available reports. I also discussed the differential diagnosis and all of the proposed management plans with the patient and individuals accompanying the patient to this visit. They had the opportunity to ask questions about the proposed management plans and to have those questions answered.     uAtumn Dangelo MD

## 2024-03-27 NOTE — PROGRESS NOTES
"Subjective   Patient ID: Devora Arce is a 34 y.o. female who presents for Follow-up.    HPI   Devora Arce is a 33 yo F with PMHx HTN, depression with anxiety, PTSD, Vit D deficiency, anemia, hypothyroidism who presents for follow up.     She was started on 5 mg Bystolic for HTN and has been taking the medication without any adverse effects. Home readings are 130s-150s/90s-100s.     She c/o vertigo that started after she had COVID in Feb this year. She describes feeliing dizziness and room spinning whenever she lays down, bends over, or stands too quickly. She denies recent fall. She states she sometimes has to hold onto something to steady herself.    She c/o acute right foot pain after she injured her foot by kicking the floor last week. She is able to walk on the foot. She notes tendeness near the big toe and describes it as \"muscle pain.\"     Review of Systems   Constitutional: Negative.    Respiratory: Negative.     Cardiovascular: Negative.  Negative for chest pain and palpitations.   Gastrointestinal: Negative.  Negative for abdominal distention, abdominal pain, constipation, diarrhea, nausea and vomiting.       Objective   /80   Wt 95.3 kg (210 lb)   BMI 38.41 kg/m²     Physical Exam  Constitutional:       General: She is not in acute distress.     Appearance: Normal appearance.   HENT:      Head: Normocephalic and atraumatic.      Right Ear: Tympanic membrane, ear canal and external ear normal.      Left Ear: Tympanic membrane, ear canal and external ear normal.      Nose: Nose normal. No rhinorrhea.   Eyes:      General: No scleral icterus.     Extraocular Movements: Extraocular movements intact.      Conjunctiva/sclera: Conjunctivae normal.   Cardiovascular:      Rate and Rhythm: Normal rate and regular rhythm.      Pulses: Normal pulses.      Heart sounds: Normal heart sounds. No murmur heard.     No friction rub. No gallop.   Pulmonary:      Effort: Pulmonary effort is normal. No " respiratory distress.      Breath sounds: Normal breath sounds. No wheezing, rhonchi or rales.   Abdominal:      General: Bowel sounds are normal. There is no distension.      Palpations: Abdomen is soft.      Tenderness: There is no abdominal tenderness. There is no guarding or rebound.   Musculoskeletal:         General: Tenderness and signs of injury present. No swelling or deformity. Normal range of motion.      Right lower leg: No edema.      Left lower leg: No edema.      Comments: Tenderness to palpation of the right foot between metatarsals 1-2.   Swelling around the right ankle.    Skin:     General: Skin is warm and dry.      Capillary Refill: Capillary refill takes less than 2 seconds.      Coloration: Skin is not jaundiced.   Neurological:      General: No focal deficit present.      Mental Status: She is alert and oriented to person, place, and time. Mental status is at baseline.   Psychiatric:         Mood and Affect: Mood normal.         Behavior: Behavior normal.         Thought Content: Thought content normal.         Judgment: Judgment normal.         Assessment/Plan   Diagnoses and all orders for this visit:  Benign essential HTN  -     Comprehensive metabolic panel; Future  Hypovitaminosis  -     Vitamin D 25-Hydroxy,Total (for eval of Vitamin D levels); Future  Depression, unspecified depression type  Anemia, unspecified type  -     CBC; Future  Primary hypothyroidism  -     Tsh With Reflex To Free T4 If Abnormal; Future  Foot injury, right, initial encounter  -     XR foot right 3+ views; Future  Vertigo  -     Referral to Physical Therapy; Future  Elevated hemoglobin A1c  -     Hemoglobin A1c; Future  Routine general medical examination at a health care facility  -     Hemoglobin A1c; Future  -     CBC; Future  -     Lipid panel; Future  -     Comprehensive metabolic panel; Future  -     Tsh With Reflex To Free T4 If Abnormal; Future  -     Vitamin D 25-Hydroxy,Total (for eval of Vitamin D  levels); Future    For vertigo, we discussed the etiology to be related to malpositioned otoliths in the inner ear versus vestibular neuritis from recent COVID infection. Suspect this should resolve. We discussed the plan to refer to physical therapy and complete home exercises as outlined in the handout provided in the office today. Ears were examined and were clear with mild cerumen buildup, but B/L TM without erythema or bulging and with visible landmarks. This is less likely caused by OM or eustachian tube dysfunction. She will schedule PT as advised.     For acute right foot injury, suspect tendonitis or ligament strain. She is able to bear weight and walk without stopping. Pain is localized between the metatarsals 1-2 and could be related to the tendon of the extensor hallucis longus. Plan to obtain XR, elevate the leg, ice as needed, and iboprofen discussed with patient.     For HTN, she was recently started on Bystolic 5 mg. Home diastolic readings are high (90s-100s). She is advised to continue taking medications as prescribed and record home BP in log book. Increase water intake and exercise regularly. Plan to reassess at her next appointment in May.

## 2024-03-27 NOTE — PATIENT INSTRUCTIONS
It was nice to see you again today.   Continue to take BP meds as prescribed and keep a daily BP log. Be sure to drink plenty of water throughout the day.   For your foot injury, please schedule a foot X ray.   For vertigo, schedule an appointment with [facility].   Complete home exercises provided in the handout given today.   Continue counseling therapy for depression.   Follow up for your next appointment in May.

## 2024-04-04 ENCOUNTER — HOSPITAL ENCOUNTER (OUTPATIENT)
Dept: RADIOLOGY | Facility: HOSPITAL | Age: 34
Discharge: HOME | End: 2024-04-04
Payer: COMMERCIAL

## 2024-04-04 DIAGNOSIS — S99.921A FOOT INJURY, RIGHT, INITIAL ENCOUNTER: ICD-10-CM

## 2024-04-04 PROCEDURE — 73630 X-RAY EXAM OF FOOT: CPT | Mod: RT

## 2024-04-04 PROCEDURE — 73630 X-RAY EXAM OF FOOT: CPT | Mod: RIGHT SIDE | Performed by: RADIOLOGY

## 2024-04-05 NOTE — PROGRESS NOTES
I reviewed the resident/fellow's documentation and discussed the patient with the resident/fellow. I agree with the resident/fellow's medical decision making as documented in the note.    Autumn Dangelo MD

## 2024-04-06 NOTE — RESULT ENCOUNTER NOTE
Results reviewed.  There are some minor abnormalities, which are not concerning.  No changes in medications are needed at this time.  Please continue with current treatment.    Best,  Dr. Leyva

## 2024-05-24 ENCOUNTER — APPOINTMENT (OUTPATIENT)
Dept: PRIMARY CARE | Facility: CLINIC | Age: 34
End: 2024-05-24
Payer: COMMERCIAL

## 2024-06-04 ENCOUNTER — LAB (OUTPATIENT)
Dept: LAB | Facility: LAB | Age: 34
End: 2024-06-04
Payer: COMMERCIAL

## 2024-06-04 ENCOUNTER — OFFICE VISIT (OUTPATIENT)
Dept: PRIMARY CARE | Facility: CLINIC | Age: 34
End: 2024-06-04
Payer: COMMERCIAL

## 2024-06-04 VITALS — BODY MASS INDEX: 38.23 KG/M2 | DIASTOLIC BLOOD PRESSURE: 89 MMHG | WEIGHT: 209 LBS | SYSTOLIC BLOOD PRESSURE: 139 MMHG

## 2024-06-04 DIAGNOSIS — E03.9 PRIMARY HYPOTHYROIDISM: ICD-10-CM

## 2024-06-04 DIAGNOSIS — D64.9 ANEMIA, UNSPECIFIED TYPE: ICD-10-CM

## 2024-06-04 DIAGNOSIS — I10 BENIGN ESSENTIAL HTN: Primary | ICD-10-CM

## 2024-06-04 DIAGNOSIS — R73.09 ELEVATED HEMOGLOBIN A1C: ICD-10-CM

## 2024-06-04 DIAGNOSIS — E56.9 HYPOVITAMINOSIS: ICD-10-CM

## 2024-06-04 DIAGNOSIS — I10 BENIGN ESSENTIAL HTN: ICD-10-CM

## 2024-06-04 DIAGNOSIS — Z00.00 ROUTINE GENERAL MEDICAL EXAMINATION AT A HEALTH CARE FACILITY: ICD-10-CM

## 2024-06-04 DIAGNOSIS — F32.A DEPRESSION, UNSPECIFIED DEPRESSION TYPE: ICD-10-CM

## 2024-06-04 LAB
25(OH)D3 SERPL-MCNC: 27 NG/ML (ref 30–100)
ALBUMIN SERPL BCP-MCNC: 3.8 G/DL (ref 3.4–5)
ALP SERPL-CCNC: 69 U/L (ref 33–110)
ALT SERPL W P-5'-P-CCNC: 10 U/L (ref 7–45)
ANION GAP SERPL CALC-SCNC: 12 MMOL/L (ref 10–20)
AST SERPL W P-5'-P-CCNC: 13 U/L (ref 9–39)
BILIRUB SERPL-MCNC: 0.3 MG/DL (ref 0–1.2)
BUN SERPL-MCNC: 13 MG/DL (ref 6–23)
CALCIUM SERPL-MCNC: 8.7 MG/DL (ref 8.6–10.6)
CHLORIDE SERPL-SCNC: 104 MMOL/L (ref 98–107)
CHOLEST SERPL-MCNC: 164 MG/DL (ref 0–199)
CHOLESTEROL/HDL RATIO: 2.8
CO2 SERPL-SCNC: 28 MMOL/L (ref 21–32)
CREAT SERPL-MCNC: 0.98 MG/DL (ref 0.5–1.05)
EGFRCR SERPLBLD CKD-EPI 2021: 79 ML/MIN/1.73M*2
ERYTHROCYTE [DISTWIDTH] IN BLOOD BY AUTOMATED COUNT: 14.4 % (ref 11.5–14.5)
EST. AVERAGE GLUCOSE BLD GHB EST-MCNC: 108 MG/DL
GLUCOSE SERPL-MCNC: 97 MG/DL (ref 74–99)
HBA1C MFR BLD: 5.4 %
HCT VFR BLD AUTO: 35.3 % (ref 36–46)
HDLC SERPL-MCNC: 58.1 MG/DL
HGB BLD-MCNC: 11.3 G/DL (ref 12–16)
LDLC SERPL CALC-MCNC: 83 MG/DL
MCH RBC QN AUTO: 26 PG (ref 26–34)
MCHC RBC AUTO-ENTMCNC: 32 G/DL (ref 32–36)
MCV RBC AUTO: 81 FL (ref 80–100)
NON HDL CHOLESTEROL: 106 MG/DL (ref 0–149)
NRBC BLD-RTO: 0 /100 WBCS (ref 0–0)
PLATELET # BLD AUTO: 375 X10*3/UL (ref 150–450)
POTASSIUM SERPL-SCNC: 4 MMOL/L (ref 3.5–5.3)
PROT SERPL-MCNC: 6.6 G/DL (ref 6.4–8.2)
RBC # BLD AUTO: 4.34 X10*6/UL (ref 4–5.2)
SODIUM SERPL-SCNC: 140 MMOL/L (ref 136–145)
TRIGL SERPL-MCNC: 116 MG/DL (ref 0–149)
TSH SERPL-ACNC: 0.82 MIU/L (ref 0.44–3.98)
VLDL: 23 MG/DL (ref 0–40)
WBC # BLD AUTO: 7.2 X10*3/UL (ref 4.4–11.3)

## 2024-06-04 PROCEDURE — 99214 OFFICE O/P EST MOD 30 MIN: CPT | Performed by: INTERNAL MEDICINE

## 2024-06-04 PROCEDURE — 83036 HEMOGLOBIN GLYCOSYLATED A1C: CPT

## 2024-06-04 PROCEDURE — 36415 COLL VENOUS BLD VENIPUNCTURE: CPT

## 2024-06-04 PROCEDURE — 80053 COMPREHEN METABOLIC PANEL: CPT

## 2024-06-04 PROCEDURE — 82306 VITAMIN D 25 HYDROXY: CPT

## 2024-06-04 PROCEDURE — 3075F SYST BP GE 130 - 139MM HG: CPT | Performed by: INTERNAL MEDICINE

## 2024-06-04 PROCEDURE — 85027 COMPLETE CBC AUTOMATED: CPT

## 2024-06-04 PROCEDURE — 84443 ASSAY THYROID STIM HORMONE: CPT

## 2024-06-04 PROCEDURE — 80061 LIPID PANEL: CPT

## 2024-06-04 PROCEDURE — 3079F DIAST BP 80-89 MM HG: CPT | Performed by: INTERNAL MEDICINE

## 2024-06-04 RX ORDER — CHLORTHALIDONE 25 MG/1
25 TABLET ORAL DAILY
Qty: 90 TABLET | Refills: 3 | Status: SHIPPED | OUTPATIENT
Start: 2024-06-04

## 2024-06-04 RX ORDER — NEBIVOLOL 10 MG/1
10 TABLET ORAL DAILY
Qty: 90 TABLET | Refills: 1 | Status: SHIPPED | OUTPATIENT
Start: 2024-06-04 | End: 2025-06-04

## 2024-06-04 RX ORDER — AMLODIPINE BESYLATE 10 MG/1
10 TABLET ORAL DAILY
Qty: 90 TABLET | Refills: 3 | Status: SHIPPED | OUTPATIENT
Start: 2024-06-04

## 2024-06-04 RX ORDER — LEVOTHYROXINE SODIUM 50 UG/1
50 TABLET ORAL DAILY
Qty: 90 TABLET | Refills: 3 | Status: SHIPPED | OUTPATIENT
Start: 2024-06-04

## 2024-06-05 NOTE — PROGRESS NOTES
Chief Complaint:   Chief Complaint   Patient presents with    Follow-up    Med Refill      HPI    Devora Arce is a 34 y.o. year old female who presents to the clinic for follow up    Past Medical History   Past Medical History:   Diagnosis Date    Depression, unspecified 09/14/2022    Depression    Other conditions influencing health status     Nulliparity    Pain in unspecified ankle and joints of unspecified foot 10/15/2015    Ankle pain    Personal history of diseases of the skin and subcutaneous tissue     History of hyperpigmentation of skin    Personal history of other benign neoplasm     History of uterine fibroid    Personal history of other diseases of the circulatory system 12/19/2014    History of essential hypertension    Personal history of other diseases of the female genital tract 06/04/2013    History of vaginitis    Personal history of other specified conditions 07/20/2015    History of insomnia    Snoring     Snoring      Past Surgical History:   Past Surgical History:   Procedure Laterality Date    ADENOIDECTOMY  01/27/2014    Adenoidectomy    APPENDECTOMY  01/27/2014    Appendectomy    TONSILLECTOMY  01/27/2014    Tonsillectomy     Family History:   No family history on file.  Social History:   Tobacco Use: Low Risk  (11/13/2023)    Patient History     Smoking Tobacco Use: Never     Smokeless Tobacco Use: Never     Passive Exposure: Not on file      Social History     Substance and Sexual Activity   Alcohol Use Never        Allergies:   Allergies   Allergen Reactions    Bee Pollen Unknown    House Dust Unknown     Sneezing, stuffy nose        ROS   Review of Systems     Objective   Vitals:    06/04/24 1541   BP: 140/90      BMI Readings from Last 15 Encounters:   06/04/24 38.23 kg/m²   03/27/24 38.41 kg/m²   02/07/24 39.14 kg/m²   11/13/23 38.96 kg/m²   11/01/23 38.41 kg/m²   08/25/23 41.34 kg/m²   05/10/23 40.79 kg/m²   05/02/23 39.14 kg/m²   04/11/23 39.32 kg/m²   03/06/23 39.51 kg/m²  "  09/14/22 40.42 kg/m²   08/12/22 40.64 kg/m²   06/15/22 41.35 kg/m²   02/28/22 41.15 kg/m²   01/14/22 41.34 kg/m²      94.8 kg (209 lb) (6/4/2024  3:41 PM)      Physical Exam  Physical Exam     Labs:  CBC:  Recent Labs     11/01/23  1136 04/11/23  1503 09/28/22  1058   WBC 5.6 6.2 6.9   HGB 11.9* 12.4 11.4*   HCT 39.8 41.1 37.2   * 439 449   MCV 83 79* 78*     CMP:  Recent Labs     02/07/24  1223 11/01/23  1136 04/11/23  1503    141 140   K 3.6 3.4* 3.8    99 102   CO2 30 28 30   ANIONGAP 15 17 12   BUN 20 17 7   CREATININE 0.93 1.26* 0.79   EGFR 84 59*  --    GLUCOSE 87 87 94     Recent Labs     02/07/24  1223 11/01/23  1136 04/11/23  1503   ALBUMIN 4.0 4.0 4.3   ALKPHOS 64 79 80   ALT 13 14 14   AST 14 15 15   BILITOT 0.3 0.3 0.5     Calcium/Phos:   Lab Results   Component Value Date    CALCIUM 9.9 02/07/2024      COAG: No results for input(s): \"INR\", \"DDIMERVTE\" in the last 53769 hours.  CRP: No results found for: \"CRP\"   [unfilled]   ENDO:  Recent Labs     11/01/23  1136 04/11/23  1503 09/28/22  1058 01/24/22  1005 10/28/21  1320 08/03/21  1531   TSH  --  0.75 3.15  --  2.13 1.32   HGBA1C 5.5 5.7* 5.7* 5.6 5.9*  --       CARDIAC: No results for input(s): \"LDH\", \"CKMB\", \"TROPHS\", \"BNP\" in the last 19163 hours.    No lab exists for component: \"CK\", \"CKMBP\"  Recent Labs     04/11/23  1503 09/28/22  1058 07/05/22  1152   CHOL 177 168 152   LDLF 97 90 77   HDL 70.2 65.1 62.7   TRIG 51 67 61     No data recorded    Current Medications:  Current Outpatient Medications   Medication Sig Dispense Refill    amLODIPine (Norvasc) 10 mg tablet Take 1 tablet (10 mg) by mouth once daily. 90 tablet 3    Auvelity  mg tablet, IR and ER, biphasic Take 1 tablet by mouth once daily.      buPROPion XL (Wellbutrin XL) 300 mg 24 hr tablet Take 1 tablet (300 mg) by mouth once daily. 90 tablet 3    chlorthalidone (Hygroton) 25 mg tablet Take 1 tablet (25 mg) by mouth once daily. 90 tablet 3    clobetasol " (Temovate) 0.05 % ointment Apply topically 2 times a day.      cloNIDine (Catapres) 0.1 mg tablet Take 1 tablet (0.1 mg) by mouth 2 times a day.      escitalopram (Lexapro) 5 mg tablet Take 1 tablet (5 mg) by mouth once daily. 90 tablet 3    ferrous sulfate 325 (65 Fe) MG EC tablet Take 1 tablet (325 mg) by mouth once daily with a meal. Do not crush, chew, or split. 90 tablet 1    fexofenadine (Allegra) 180 mg tablet Take 1 tablet (180 mg) by mouth once daily. 30 tablet 0    hydrOXYzine HCL (Atarax) 25 mg tablet Take 1 tablet (25 mg) by mouth once daily as needed for anxiety. 90 tablet 3    levothyroxine (Synthroid, Levoxyl) 50 mcg tablet Take 1 tablet (50 mcg) by mouth once daily. 90 tablet 3    nebivolol (Bystolic) 10 mg tablet Take 1 tablet (10 mg) by mouth once daily. 90 tablet 1     No current facility-administered medications for this visit.       Assessment and Plan  Devora was seen today for follow-up and med refill.  Diagnoses and all orders for this visit:  Benign essential HTN (Primary)  -     nebivolol (Bystolic) 10 mg tablet; Take 1 tablet (10 mg) by mouth once daily.  -     amLODIPine (Norvasc) 10 mg tablet; Take 1 tablet (10 mg) by mouth once daily.  -     chlorthalidone (Hygroton) 25 mg tablet; Take 1 tablet (25 mg) by mouth once daily.  Depression, unspecified depression type  Primary hypothyroidism  -     levothyroxine (Synthroid, Levoxyl) 50 mcg tablet; Take 1 tablet (50 mcg) by mouth once daily.     HTN  Not at the goal  Doesn check regularly  Continue with Amlodipine and Chlorthalidone  Increase Bystolic to 10 mg daily  Monitor BP at least twice per week  Keep the log  Bring it with you to the next visit    Depression  Stable  On Wellbutrin, which could be causing BP numbers to stay up  No changes in meds  Follow up with psych    Hypothyroidism  Recommend rechecking levels  On levothyroxine 50 mcg daily    Immunizations:  Immunization History   Administered Date(s) Administered    Flu vaccine  (IIV4), preservative free *Check age/dose* 10/08/2021    Flu vaccine, quadrivalent, no egg protein, age 6 month or greater (FLUCELVAX) 10/18/2018, 09/30/2020, 11/11/2022    HPV, Quadrivalent 07/05/2007, 09/07/2007    Hepatitis A vaccine, pediatric/adolescent (HAVRIX, VAQTA) 10/23/2007    Hepatitis B vaccine, pediatric/adolescent (RECOMBIVAX, ENGERIX) 05/23/1996, 07/11/1996, 11/17/1996    Influenza, Unspecified 09/24/2009, 10/15/2022    Influenza, injectable, quadrivalent 10/18/2018, 09/30/2020    Influenza, seasonal, injectable 09/30/2015, 12/02/2016, 11/13/2019    Pfizer COVID-19 vaccine, bivalent, age 12 years and older (30 mcg/0.3 mL) 11/23/2022    Pfizer Gray Cap SARS-CoV-2 08/10/2022, 08/10/2022    Pfizer Purple Cap SARS-CoV-2 04/06/2021, 04/28/2021, 12/03/2021    Polio, Unspecified 1992, 1992, 04/13/1993, 11/18/1993, 02/20/1997    Tdap vaccine, age 7 year and older (BOOSTRIX, ADACEL) 08/13/2010, 11/01/2023     Please follow up in 3 months

## 2024-06-05 NOTE — RESULT ENCOUNTER NOTE
Good morning  Your vitamin D is persistently low  Please start taking it every day  In the summer: 2000 units daily  In the winter: 5000 units daily

## 2024-09-10 ENCOUNTER — APPOINTMENT (OUTPATIENT)
Dept: PRIMARY CARE | Facility: CLINIC | Age: 34
End: 2024-09-10
Payer: COMMERCIAL

## 2024-09-25 ENCOUNTER — APPOINTMENT (OUTPATIENT)
Dept: PRIMARY CARE | Facility: CLINIC | Age: 34
End: 2024-09-25
Payer: COMMERCIAL

## 2024-10-03 ENCOUNTER — APPOINTMENT (OUTPATIENT)
Dept: PRIMARY CARE | Facility: CLINIC | Age: 34
End: 2024-10-03
Payer: COMMERCIAL

## 2024-10-09 ENCOUNTER — APPOINTMENT (OUTPATIENT)
Dept: PRIMARY CARE | Facility: CLINIC | Age: 34
End: 2024-10-09
Payer: COMMERCIAL

## 2024-10-09 VITALS
HEART RATE: 67 BPM | BODY MASS INDEX: 37.91 KG/M2 | HEIGHT: 62 IN | WEIGHT: 206 LBS | DIASTOLIC BLOOD PRESSURE: 85 MMHG | SYSTOLIC BLOOD PRESSURE: 125 MMHG

## 2024-10-09 DIAGNOSIS — I10 BENIGN ESSENTIAL HTN: Primary | ICD-10-CM

## 2024-10-09 DIAGNOSIS — F32.A DEPRESSION, UNSPECIFIED DEPRESSION TYPE: ICD-10-CM

## 2024-10-09 DIAGNOSIS — Z23 FLU VACCINE NEED: ICD-10-CM

## 2024-10-09 DIAGNOSIS — E03.9 PRIMARY HYPOTHYROIDISM: ICD-10-CM

## 2024-10-09 PROCEDURE — 1036F TOBACCO NON-USER: CPT | Performed by: INTERNAL MEDICINE

## 2024-10-09 PROCEDURE — 90656 IIV3 VACC NO PRSV 0.5 ML IM: CPT | Performed by: INTERNAL MEDICINE

## 2024-10-09 PROCEDURE — 3008F BODY MASS INDEX DOCD: CPT | Performed by: INTERNAL MEDICINE

## 2024-10-09 PROCEDURE — 99213 OFFICE O/P EST LOW 20 MIN: CPT | Performed by: INTERNAL MEDICINE

## 2024-10-09 PROCEDURE — 3074F SYST BP LT 130 MM HG: CPT | Performed by: INTERNAL MEDICINE

## 2024-10-09 PROCEDURE — 93000 ELECTROCARDIOGRAM COMPLETE: CPT | Performed by: INTERNAL MEDICINE

## 2024-10-09 PROCEDURE — 90471 IMMUNIZATION ADMIN: CPT | Performed by: INTERNAL MEDICINE

## 2024-10-09 PROCEDURE — 3079F DIAST BP 80-89 MM HG: CPT | Performed by: INTERNAL MEDICINE

## 2024-10-09 RX ORDER — LEVOTHYROXINE SODIUM 50 UG/1
50 TABLET ORAL DAILY
Qty: 90 TABLET | Refills: 3 | Status: SHIPPED | OUTPATIENT
Start: 2024-10-09

## 2024-10-09 ASSESSMENT — ENCOUNTER SYMPTOMS
FEVER: 0
SHORTNESS OF BREATH: 0
ABDOMINAL DISTENTION: 0
CONFUSION: 0
ACTIVITY CHANGE: 0
PALPITATIONS: 0
DIZZINESS: 1
WEAKNESS: 0
DIARRHEA: 0
COUGH: 0
NAUSEA: 0
ABDOMINAL PAIN: 0
FATIGUE: 0
AGITATION: 0
LIGHT-HEADEDNESS: 0
WHEEZING: 0
VOMITING: 0

## 2024-10-09 ASSESSMENT — LIFESTYLE VARIABLES
HOW OFTEN DO YOU HAVE SIX OR MORE DRINKS ON ONE OCCASION: NEVER
AUDIT-C TOTAL SCORE: 0
HOW MANY STANDARD DRINKS CONTAINING ALCOHOL DO YOU HAVE ON A TYPICAL DAY: PATIENT DOES NOT DRINK
SKIP TO QUESTIONS 9-10: 1
HOW OFTEN DO YOU HAVE A DRINK CONTAINING ALCOHOL: NEVER

## 2024-10-09 NOTE — ASSESSMENT & PLAN NOTE
- Pt with vertigo symptoms when laying down   - Will refer to [facility]    None

## 2024-10-09 NOTE — PROGRESS NOTES
Subjective   Devora Arce is a 34 y.o. female with PMH of HTN, depression, and hypothyroidism who presents for Follow-up (Flu shot).    Patient states that she has been doing ok since the time of the last visit. Her BP is much improved today but when she checks at home it is usually 130-150s systolic. She states that she has been experiencing some vertigo symptoms for the past few months when she lays down. Feels like the room is spinning. Denies any n/v, vision changes, or numbness/tingling. Also admits to some occasional non-radiating chest tightness the past few months that has been on and off- she is attributing it to having COVID twice. She has been receiving monthly ketamine infusions for her depression and feels that it is helping.             Review of Systems   Constitutional:  Negative for activity change, fatigue and fever.   Respiratory:  Negative for cough, shortness of breath and wheezing.    Cardiovascular:  Negative for chest pain, palpitations and leg swelling.   Gastrointestinal:  Negative for abdominal distention, abdominal pain, diarrhea, nausea and vomiting.   Neurological:  Positive for dizziness. Negative for weakness and light-headedness.        Vertigo symptoms    Psychiatric/Behavioral:  Negative for agitation and confusion.        Objective   Physical Exam  Constitutional:       General: She is not in acute distress.     Appearance: Normal appearance.   Cardiovascular:      Rate and Rhythm: Normal rate and regular rhythm.      Heart sounds: Normal heart sounds. No murmur heard.     No friction rub. No gallop.   Pulmonary:      Effort: Pulmonary effort is normal. No respiratory distress.      Breath sounds: Normal breath sounds. No wheezing, rhonchi or rales.   Abdominal:      General: Abdomen is flat. Bowel sounds are normal. There is no distension.      Palpations: Abdomen is soft.      Tenderness: There is no abdominal tenderness.   Musculoskeletal:         General: No swelling or  tenderness. Normal range of motion.   Skin:     General: Skin is warm and dry.      Findings: No erythema or rash.   Neurological:      General: No focal deficit present.      Mental Status: She is alert and oriented to person, place, and time. Mental status is at baseline.   Psychiatric:         Mood and Affect: Mood normal.         Behavior: Behavior normal.         Assessment/Plan   Problem List Items Addressed This Visit       Depression     - Continue Auvelity   - Receives monthly ketamine infusions   - EKG today in office to assess for arrhythmias I/s/o ketamine infusions showed NSR  - Will order echo to assess for wall motion abnormalities          Relevant Orders    ECG 12 lead (Clinic Performed) (Completed)    Benign essential HTN - Primary     - /85 today in office   - Continue amlodipine 10mg daily and nebivolol 10mg daily          Relevant Orders    Transthoracic Echo (TTE) Complete    ECG 12 lead (Clinic Performed) (Completed)    Primary hypothyroidism     - Continue levothyroxine          Relevant Medications    levothyroxine (Synthroid, Levoxyl) 50 mcg tablet     Other Visit Diagnoses       Flu vaccine need        Relevant Orders    Flu vaccine, trivalent, preservative free, age 6 months and greater (Fluarix/Fluzone/Flulaval) (Completed)             # Health Maintenance   - Yearly labs UTD   - Received flu vaccine     RTC in 4 months

## 2024-10-09 NOTE — ASSESSMENT & PLAN NOTE
- Continue Auvelity   - Receives monthly ketamine infusions   - EKG today in office to assess for arrhythmias I/s/o ketamine infusions showed NSR  - Will order echo to assess for wall motion abnormalities

## 2024-10-09 NOTE — PROGRESS NOTES
I reviewed the resident/fellow's documentation and discussed the patient with the resident/fellow. I agree with the resident/fellow's medical decision making as documented in the note.  As the attending physician,  I reviewed the relevant imaging studies and available reports. I also discussed the differential diagnosis and all of the proposed management plans with the resident.    Autumn Dangelo MD

## 2024-10-18 ENCOUNTER — ANCILLARY PROCEDURE (OUTPATIENT)
Dept: CARDIOLOGY | Facility: CLINIC | Age: 34
End: 2024-10-18
Payer: COMMERCIAL

## 2024-10-18 DIAGNOSIS — I10 BENIGN ESSENTIAL HTN: ICD-10-CM

## 2024-10-18 LAB
AORTIC VALVE PEAK VELOCITY: 1.6 M/S
AV PEAK GRADIENT: 10.3 MMHG
AVA (PEAK VEL): 1.62 CM2
EJECTION FRACTION APICAL 4 CHAMBER: 67.6
EJECTION FRACTION: 63 %
LEFT ATRIUM VOLUME AREA LENGTH INDEX BSA: 33.8 ML/M2
LEFT VENTRICLE INTERNAL DIMENSION DIASTOLE: 5.17 CM (ref 3.5–6)
LEFT VENTRICULAR OUTFLOW TRACT DIAMETER: 1.82 CM
MITRAL VALVE E/A RATIO: 2.06
RIGHT VENTRICLE FREE WALL PEAK S': 14 CM/S
RIGHT VENTRICLE PEAK SYSTOLIC PRESSURE: 23.1 MMHG
TRICUSPID ANNULAR PLANE SYSTOLIC EXCURSION: 3.4 CM

## 2024-10-18 PROCEDURE — 93306 TTE W/DOPPLER COMPLETE: CPT

## 2024-10-18 PROCEDURE — 93306 TTE W/DOPPLER COMPLETE: CPT | Performed by: STUDENT IN AN ORGANIZED HEALTH CARE EDUCATION/TRAINING PROGRAM

## 2024-10-19 DIAGNOSIS — R93.1 ABNORMAL ECHOCARDIOGRAM: Primary | ICD-10-CM

## 2024-10-30 ENCOUNTER — OFFICE VISIT (OUTPATIENT)
Dept: CARDIOLOGY | Facility: CLINIC | Age: 34
End: 2024-10-30
Payer: COMMERCIAL

## 2024-10-30 VITALS
HEART RATE: 88 BPM | HEIGHT: 62 IN | DIASTOLIC BLOOD PRESSURE: 68 MMHG | SYSTOLIC BLOOD PRESSURE: 118 MMHG | WEIGHT: 207 LBS | OXYGEN SATURATION: 100 % | RESPIRATION RATE: 18 BRPM | BODY MASS INDEX: 38.09 KG/M2

## 2024-10-30 DIAGNOSIS — R07.9 CHEST PAIN, UNSPECIFIED TYPE: Primary | ICD-10-CM

## 2024-10-30 DIAGNOSIS — R93.1 ABNORMAL ECHOCARDIOGRAM: ICD-10-CM

## 2024-10-30 PROCEDURE — 3074F SYST BP LT 130 MM HG: CPT

## 2024-10-30 PROCEDURE — 3008F BODY MASS INDEX DOCD: CPT

## 2024-10-30 PROCEDURE — 3078F DIAST BP <80 MM HG: CPT

## 2024-10-30 PROCEDURE — 99214 OFFICE O/P EST MOD 30 MIN: CPT

## 2024-10-30 PROCEDURE — 99244 OFF/OP CNSLTJ NEW/EST MOD 40: CPT

## 2024-10-30 PROCEDURE — 1036F TOBACCO NON-USER: CPT

## 2024-11-06 ENCOUNTER — APPOINTMENT (OUTPATIENT)
Dept: CARDIOLOGY | Facility: HOSPITAL | Age: 34
End: 2024-11-06
Payer: COMMERCIAL

## 2024-11-13 ENCOUNTER — HOSPITAL ENCOUNTER (OUTPATIENT)
Dept: CARDIOLOGY | Facility: HOSPITAL | Age: 34
Discharge: HOME | End: 2024-11-13
Payer: COMMERCIAL

## 2024-11-13 DIAGNOSIS — R07.9 CHEST PAIN, UNSPECIFIED TYPE: ICD-10-CM

## 2024-11-13 PROCEDURE — 93016 CV STRESS TEST SUPVJ ONLY: CPT | Performed by: STUDENT IN AN ORGANIZED HEALTH CARE EDUCATION/TRAINING PROGRAM

## 2024-11-13 PROCEDURE — 93350 STRESS TTE ONLY: CPT | Performed by: STUDENT IN AN ORGANIZED HEALTH CARE EDUCATION/TRAINING PROGRAM

## 2024-11-13 PROCEDURE — 93321 DOPPLER ECHO F-UP/LMTD STD: CPT | Performed by: STUDENT IN AN ORGANIZED HEALTH CARE EDUCATION/TRAINING PROGRAM

## 2024-11-13 PROCEDURE — 93018 CV STRESS TEST I&R ONLY: CPT | Performed by: STUDENT IN AN ORGANIZED HEALTH CARE EDUCATION/TRAINING PROGRAM

## 2024-11-13 PROCEDURE — 93321 DOPPLER ECHO F-UP/LMTD STD: CPT

## 2024-12-06 ENCOUNTER — APPOINTMENT (OUTPATIENT)
Dept: CARDIOLOGY | Facility: CLINIC | Age: 34
End: 2024-12-06
Payer: COMMERCIAL

## 2024-12-06 DIAGNOSIS — R07.9 CHEST PAIN, UNSPECIFIED TYPE: ICD-10-CM

## 2024-12-06 DIAGNOSIS — I51.89 FAMILIAL HEART DISEASE: Primary | ICD-10-CM

## 2024-12-06 PROCEDURE — 99213 OFFICE O/P EST LOW 20 MIN: CPT

## 2024-12-06 ASSESSMENT — ENCOUNTER SYMPTOMS: DEPRESSION: 1

## 2024-12-06 NOTE — PATIENT INSTRUCTIONS
Ermelinda,    Please Schedule Cardiac Calcium Score given your family history.    Follow up in 3 months    Saba HARRISON

## 2024-12-06 NOTE — PROGRESS NOTES
Virtual or Telephone Consent    An interactive audio and video telecommunication system which permits real time communications between the patient (at the originating site) and provider (at the distant site) was utilized to provide this telehealth service.   Verbal consent was requested and obtained from Devora Arce on this date, 12/06/24 for a telehealth visit.     Chief Complaint:   FUV      History Of Present Illness:    Devora Arce is a 34 y.o. 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    She continues to have chest tightness during rest which can last 1-2 minutes which typically occurs  . She has noticed a correlation when she is stressed. It does not occur with activity.She will be taking a break from the Ketamine infusion because she does not feel it is helping her.  We discussed the results of the Stress Echocardiogram.     Review of Systems   Cardiovascular:  Positive for chest pain.   Psychiatric/Behavioral:  Positive for depression.    All other systems reviewed and are negative.    Last Recorded Vitals:  There were no vitals filed for this visit.    Past Medical History:  She has a past medical history of Depression, unspecified (09/14/2022), Other conditions influencing health status, Pain in unspecified ankle and joints of unspecified foot (10/15/2015), Personal history of diseases of the skin and subcutaneous tissue, Personal history of other benign neoplasm, Personal history of other diseases of the circulatory system (12/19/2014), Personal history of other diseases of the female genital tract (06/04/2013), Personal history of other specified conditions (07/20/2015), and Snoring.    Past Surgical History:  She has a past surgical history that includes Adenoidectomy (01/27/2014); Tonsillectomy (01/27/2014); and Appendectomy (01/27/2014).      Social History:  She reports that she has never smoked. She  has been exposed to tobacco smoke. She has never used smokeless tobacco. She reports current drug use. Drug: Other. She reports that she does not drink alcohol.    Family History:  No family history on file.     Allergies:  Bee pollen and House dust    Outpatient Medications:  Current Outpatient Medications   Medication Instructions    amLODIPine (NORVASC) 10 mg, oral, Daily    Auvelity  mg tablet, IR and ER, biphasic 1 tablet, Daily    chlorthalidone (HYGROTON) 25 mg, oral, Daily    clobetasol (Temovate) 0.05 % ointment 2 times daily    cloNIDine (CATAPRES) 0.1 mg, 2 times daily    escitalopram (LEXAPRO) 5 mg, oral, Daily    fexofenadine (ALLEGRA) 180 mg, oral, Daily    hydrOXYzine HCL (ATARAX) 25 mg, oral, Daily PRN    levothyroxine (SYNTHROID, LEVOXYL) 50 mcg, oral, Daily    nebivolol (BYSTOLIC) 10 mg, oral, Daily    NON FORMULARY Ketamine infusion once monthly for depression       Physical Exam:   Physical examination performed via telemedicine encounter:  General: awake, alert, non-diaphoretic, no psychomotor agitation, no acute distress.  Head: atraumatic, normocephalic, no rashes or lesions noted.  Eyes: no redness, discharge, swelling, or lesions.  Nose: no redness, swelling, discharge, deformity, or impetigo/crusting.  Skin: no lesions, wounds, erythema, or cyanosis noted on face or hands.  Cardiopulmonary: no increased respiratory effort, speaking in clear sentences, inspiratory to expiratory ratio within normal limits.  Musculoskeletal: normal range of motion of neck, upper extremities, and hands with no obvious synovitis.  Neurologic: cranial nerves grossly normal, speech normal rate and rhythm, moved both upper extremities equally.  Psychiatric: normal appearance, normal behavior, and normal attitude; well groomed, pleasant, cooperative.   Last Labs:  CBC -  Lab Results   Component Value Date    WBC 7.2 06/04/2024    HGB 11.3 (L) 06/04/2024    HCT 35.3 (L) 06/04/2024    MCV 81 06/04/2024    PLT  375 06/04/2024       CMP -  Lab Results   Component Value Date    CALCIUM 8.7 06/04/2024    PROT 6.6 06/04/2024    ALBUMIN 3.8 06/04/2024    AST 13 06/04/2024    ALT 10 06/04/2024    ALKPHOS 69 06/04/2024    BILITOT 0.3 06/04/2024       LIPID PANEL -   Lab Results   Component Value Date    CHOL 164 06/04/2024    TRIG 116 06/04/2024    HDL 58.1 06/04/2024    CHHDL 2.8 06/04/2024    LDLF 97 04/11/2023    VLDL 23 06/04/2024    NHDL 106 06/04/2024       RENAL FUNCTION PANEL -   Lab Results   Component Value Date    GLUCOSE 97 06/04/2024     06/04/2024    K 4.0 06/04/2024     06/04/2024    CO2 28 06/04/2024    ANIONGAP 12 06/04/2024    BUN 13 06/04/2024    CREATININE 0.98 06/04/2024    CALCIUM 8.7 06/04/2024    ALBUMIN 3.8 06/04/2024        Lab Results   Component Value Date    HGBA1C 5.4 06/04/2024       Last Cardiology Tests:  ECG:  ECG 12 lead (Clinic Performed) 10/09/2024    Echocardiogram:  CONCLUSIONS:   1. Left ventricular ejection fraction is normal, calculated by Taveras's biplane at 63%.   2. There is normal right ventricular global systolic function.   3. Mild to moderate mitral valve regurgitation.   4. Trivial to small pericardial effusion.    Echocardiogram stress test 11/13/2024  Summary:   1. Submaximal level of stress achieved. Of note, the patient took her beta blocker prior to stress testing reducing peak heart rate and overall sensitivity.   2. The patient exercised to stage III on a Carter protocol for 7 minutes and 17 seconds, achieving 8.0 METS. The peak heart rate achieved was 144 bpm, which was 77 % of the age predicted target heart rate.   3. The resting ejection fraction was estimated at 55 to 60% with a peak exercise ejection fraction estimated at 70 to 75%.   4. No clinical, echocardiographic or electrocardiographic evidence for ischemia at a maximal workload.     Ejection Fractions:  EF   Date/Time Value Ref Range Status   10/18/2024 10:48 AM 63 %        Assessment/Plan   Devora  Claude is a 34 y.o. 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    She continues to have chest tightness during rest which can last 1-2 minutes which typically occurs  . She has noticed a correlation when she is stressed. It does not occur with activity. She is unsure how home BP have been running. She will be taking a break from the Ketamine infusion because she does not feel it is helping her.  We discussed the results of the Stress Echocardiogram which was negative for ischemia.     Given the history of familial heart disease will have her obtain a CAC.    - Increase Activity 150 min/week of moderate intensity    - Follow up in 3 months or sooner if symptoms worsen.      SHIVAM Guerrero-CNP

## 2024-12-23 ENCOUNTER — APPOINTMENT (OUTPATIENT)
Dept: PRIMARY CARE | Facility: CLINIC | Age: 34
End: 2024-12-23
Payer: COMMERCIAL

## 2025-01-03 ENCOUNTER — APPOINTMENT (OUTPATIENT)
Dept: PRIMARY CARE | Facility: CLINIC | Age: 35
End: 2025-01-03
Payer: COMMERCIAL

## 2025-01-03 ENCOUNTER — LAB (OUTPATIENT)
Dept: LAB | Facility: LAB | Age: 35
End: 2025-01-03
Payer: COMMERCIAL

## 2025-01-03 VITALS
HEIGHT: 62 IN | BODY MASS INDEX: 37.36 KG/M2 | SYSTOLIC BLOOD PRESSURE: 136 MMHG | HEART RATE: 67 BPM | DIASTOLIC BLOOD PRESSURE: 86 MMHG | WEIGHT: 203 LBS

## 2025-01-03 DIAGNOSIS — I10 BENIGN ESSENTIAL HTN: ICD-10-CM

## 2025-01-03 DIAGNOSIS — E03.9 PRIMARY HYPOTHYROIDISM: ICD-10-CM

## 2025-01-03 DIAGNOSIS — F32.A DEPRESSION, UNSPECIFIED DEPRESSION TYPE: ICD-10-CM

## 2025-01-03 DIAGNOSIS — E56.9 HYPOVITAMINOSIS: ICD-10-CM

## 2025-01-03 DIAGNOSIS — E03.9 PRIMARY HYPOTHYROIDISM: Primary | ICD-10-CM

## 2025-01-03 DIAGNOSIS — M54.9 BACK PAIN, UNSPECIFIED BACK LOCATION, UNSPECIFIED BACK PAIN LATERALITY, UNSPECIFIED CHRONICITY: ICD-10-CM

## 2025-01-03 LAB
25(OH)D3 SERPL-MCNC: 20 NG/ML (ref 30–100)
ALBUMIN SERPL BCP-MCNC: 4 G/DL (ref 3.4–5)
ALP SERPL-CCNC: 72 U/L (ref 33–110)
ALT SERPL W P-5'-P-CCNC: 13 U/L (ref 7–45)
ANION GAP SERPL CALC-SCNC: 13 MMOL/L (ref 10–20)
APPEARANCE UR: CLEAR
AST SERPL W P-5'-P-CCNC: 13 U/L (ref 9–39)
BILIRUB SERPL-MCNC: 0.4 MG/DL (ref 0–1.2)
BILIRUB UR STRIP.AUTO-MCNC: NEGATIVE MG/DL
BUN SERPL-MCNC: 11 MG/DL (ref 6–23)
CALCIUM SERPL-MCNC: 9.2 MG/DL (ref 8.6–10.6)
CHLORIDE SERPL-SCNC: 103 MMOL/L (ref 98–107)
CHOLEST SERPL-MCNC: 189 MG/DL (ref 0–199)
CHOLESTEROL/HDL RATIO: 2.7
CO2 SERPL-SCNC: 27 MMOL/L (ref 21–32)
COLOR UR: NORMAL
CREAT SERPL-MCNC: 0.82 MG/DL (ref 0.5–1.05)
EGFRCR SERPLBLD CKD-EPI 2021: >90 ML/MIN/1.73M*2
ERYTHROCYTE [DISTWIDTH] IN BLOOD BY AUTOMATED COUNT: 13.7 % (ref 11.5–14.5)
EST. AVERAGE GLUCOSE BLD GHB EST-MCNC: 111 MG/DL
FERRITIN SERPL-MCNC: 22 NG/ML (ref 8–150)
FOLATE SERPL-MCNC: 13.3 NG/ML
GLUCOSE SERPL-MCNC: 90 MG/DL (ref 74–99)
GLUCOSE UR STRIP.AUTO-MCNC: NORMAL MG/DL
HBA1C MFR BLD: 5.5 %
HCT VFR BLD AUTO: 39.6 % (ref 36–46)
HDLC SERPL-MCNC: 70.5 MG/DL
HGB BLD-MCNC: 12.6 G/DL (ref 12–16)
KETONES UR STRIP.AUTO-MCNC: NEGATIVE MG/DL
LDLC SERPL CALC-MCNC: 104 MG/DL
LEUKOCYTE ESTERASE UR QL STRIP.AUTO: NEGATIVE
MCH RBC QN AUTO: 26 PG (ref 26–34)
MCHC RBC AUTO-ENTMCNC: 31.8 G/DL (ref 32–36)
MCV RBC AUTO: 82 FL (ref 80–100)
NITRITE UR QL STRIP.AUTO: NEGATIVE
NON HDL CHOLESTEROL: 119 MG/DL (ref 0–149)
NRBC BLD-RTO: 0 /100 WBCS (ref 0–0)
PH UR STRIP.AUTO: 6 [PH]
PLATELET # BLD AUTO: 418 X10*3/UL (ref 150–450)
POTASSIUM SERPL-SCNC: 3.9 MMOL/L (ref 3.5–5.3)
PROT SERPL-MCNC: 6.9 G/DL (ref 6.4–8.2)
PROT UR STRIP.AUTO-MCNC: NEGATIVE MG/DL
RBC # BLD AUTO: 4.85 X10*6/UL (ref 4–5.2)
RBC # UR STRIP.AUTO: NEGATIVE /UL
SODIUM SERPL-SCNC: 139 MMOL/L (ref 136–145)
SP GR UR STRIP.AUTO: 1.02
TRIGL SERPL-MCNC: 71 MG/DL (ref 0–149)
TSH SERPL-ACNC: 2.28 MIU/L (ref 0.44–3.98)
UROBILINOGEN UR STRIP.AUTO-MCNC: NORMAL MG/DL
VIT B12 SERPL-MCNC: 565 PG/ML (ref 211–911)
VLDL: 14 MG/DL (ref 0–40)
WBC # BLD AUTO: 5.8 X10*3/UL (ref 4.4–11.3)

## 2025-01-03 PROCEDURE — 82728 ASSAY OF FERRITIN: CPT

## 2025-01-03 PROCEDURE — 3079F DIAST BP 80-89 MM HG: CPT | Performed by: INTERNAL MEDICINE

## 2025-01-03 PROCEDURE — 3075F SYST BP GE 130 - 139MM HG: CPT | Performed by: INTERNAL MEDICINE

## 2025-01-03 PROCEDURE — 84443 ASSAY THYROID STIM HORMONE: CPT

## 2025-01-03 PROCEDURE — 81003 URINALYSIS AUTO W/O SCOPE: CPT

## 2025-01-03 PROCEDURE — 83036 HEMOGLOBIN GLYCOSYLATED A1C: CPT

## 2025-01-03 PROCEDURE — 85027 COMPLETE CBC AUTOMATED: CPT

## 2025-01-03 PROCEDURE — 82746 ASSAY OF FOLIC ACID SERUM: CPT

## 2025-01-03 PROCEDURE — 80053 COMPREHEN METABOLIC PANEL: CPT

## 2025-01-03 PROCEDURE — 82306 VITAMIN D 25 HYDROXY: CPT

## 2025-01-03 PROCEDURE — 3008F BODY MASS INDEX DOCD: CPT | Performed by: INTERNAL MEDICINE

## 2025-01-03 PROCEDURE — 99214 OFFICE O/P EST MOD 30 MIN: CPT | Performed by: INTERNAL MEDICINE

## 2025-01-03 PROCEDURE — 82607 VITAMIN B-12: CPT

## 2025-01-03 PROCEDURE — 80061 LIPID PANEL: CPT

## 2025-01-03 PROCEDURE — 1036F TOBACCO NON-USER: CPT | Performed by: INTERNAL MEDICINE

## 2025-01-03 ASSESSMENT — ENCOUNTER SYMPTOMS
BACK PAIN: 1
FEVER: 0
BOWEL INCONTINENCE: 0
LEG PAIN: 0
NUMBNESS: 0
ABDOMINAL PAIN: 0
DYSURIA: 0
PARESIS: 0
HEADACHES: 0

## 2025-01-03 ASSESSMENT — LIFESTYLE VARIABLES
HOW OFTEN DO YOU HAVE A DRINK CONTAINING ALCOHOL: NEVER
SKIP TO QUESTIONS 9-10: 1
HOW OFTEN DO YOU HAVE SIX OR MORE DRINKS ON ONE OCCASION: NEVER
HOW MANY STANDARD DRINKS CONTAINING ALCOHOL DO YOU HAVE ON A TYPICAL DAY: PATIENT DOES NOT DRINK
AUDIT-C TOTAL SCORE: 0

## 2025-01-03 ASSESSMENT — PATIENT HEALTH QUESTIONNAIRE - PHQ9
1. LITTLE INTEREST OR PLEASURE IN DOING THINGS: NOT AT ALL
SUM OF ALL RESPONSES TO PHQ9 QUESTIONS 1 AND 2: 0
2. FEELING DOWN, DEPRESSED OR HOPELESS: NOT AT ALL

## 2025-01-03 NOTE — PROGRESS NOTES
Chief Complaint:   Chief Complaint   Patient presents with    Follow-up    Back Pain        Devora Arce is a 34 y.o. year old female is here for follow-up.   Back Pain  This is a new problem. The current episode started 1 to 4 weeks ago. The problem occurs 2 to 4 times per day. The problem is unchanged. The pain is present in the lumbar spine. The pain does not radiate. The pain is at a severity of 5/10. Pertinent negatives include no abdominal pain, bladder incontinence, bowel incontinence, chest pain, dysuria, fever, headaches, leg pain, numbness or paresis.      Devora Arce is here for follow up on chronic conditions.  Reports no new issues.  Compliant with medications.  Denies side effects.  Needs refills on medications.  Chart reviewed, pharmacy updated, prior notes, labs, imaging, EKGs reviewed.    Past Medical History   Past Medical History:   Diagnosis Date    Depression, unspecified 09/14/2022    Depression    Other conditions influencing health status     Nulliparity    Pain in unspecified ankle and joints of unspecified foot 10/15/2015    Ankle pain    Personal history of diseases of the skin and subcutaneous tissue     History of hyperpigmentation of skin    Personal history of other benign neoplasm     History of uterine fibroid    Personal history of other diseases of the circulatory system 12/19/2014    History of essential hypertension    Personal history of other diseases of the female genital tract 06/04/2013    History of vaginitis    Personal history of other specified conditions 07/20/2015    History of insomnia    Snoring     Snoring      Past Surgical History:   Past Surgical History:   Procedure Laterality Date    ADENOIDECTOMY  01/27/2014    Adenoidectomy    APPENDECTOMY  01/27/2014    Appendectomy    TONSILLECTOMY  01/27/2014    Tonsillectomy     Family History:   No family history on file.  Social History:   Tobacco Use: Low Risk  (1/3/2025)    Patient History     Smoking Tobacco  "Use: Never     Smokeless Tobacco Use: Never     Passive Exposure: Past      Social History     Substance and Sexual Activity   Alcohol Use Never        Allergies:   Allergies   Allergen Reactions    Bee Pollen Unknown    House Dust Unknown     Sneezing, stuffy nose        ROS   Review of Systems   Constitutional:  Negative for fever.   Cardiovascular:  Negative for chest pain.   Gastrointestinal:  Negative for abdominal pain and bowel incontinence.   Genitourinary:  Negative for bladder incontinence and dysuria.   Musculoskeletal:  Positive for back pain.   Neurological:  Negative for numbness and headaches.        Objective   Vitals:    01/03/25 1351   BP: 136/86   Pulse: 67      BMI Readings from Last 15 Encounters:   01/03/25 37.13 kg/m²   10/30/24 37.86 kg/m²   10/09/24 37.68 kg/m²   06/04/24 38.23 kg/m²   03/27/24 38.41 kg/m²   02/07/24 39.14 kg/m²   11/13/23 38.96 kg/m²   11/01/23 38.41 kg/m²   08/25/23 41.34 kg/m²   07/26/23 39.31 kg/m²   05/10/23 40.79 kg/m²   05/02/23 39.14 kg/m²   04/11/23 39.32 kg/m²   03/06/23 39.51 kg/m²   09/14/22 40.42 kg/m²      92.1 kg (203 lb) (1/3/2025  1:51 PM)      Physical Exam  Physical Exam  Neurological:      Mental Status: She is alert.   Psychiatric:         Mood and Affect: Mood normal.          Labs:  CBC:  Recent Labs     06/04/24  1558 11/01/23  1136 04/11/23  1503   WBC 7.2 5.6 6.2   HGB 11.3* 11.9* 12.4   HCT 35.3* 39.8 41.1    484* 439   MCV 81 83 79*     CMP:  Recent Labs     06/04/24  1558 02/07/24  1223 11/01/23  1136    141 141   K 4.0 3.6 3.4*    100 99   CO2 28 30 28   ANIONGAP 12 15 17   BUN 13 20 17   CREATININE 0.98 0.93 1.26*   EGFR 79 84 59*   GLUCOSE 97 87 87     Recent Labs     06/04/24  1558 02/07/24  1223 11/01/23  1136   ALBUMIN 3.8 4.0 4.0   ALKPHOS 69 64 79   ALT 10 13 14   AST 13 14 15   BILITOT 0.3 0.3 0.3     Calcium/Phos:   Lab Results   Component Value Date    CALCIUM 8.7 06/04/2024      COAG: No results for input(s): \"INR\", " "\"DDIMERVTE\" in the last 28379 hours.  CRP: No results found for: \"CRP\"   [unfilled]   ENDO:  Recent Labs     06/04/24  1558 11/01/23  1136 04/11/23  1503 09/28/22  1058 01/24/22  1005 10/28/21  1320   TSH 0.82  --  0.75 3.15  --  2.13   HGBA1C 5.4 5.5 5.7* 5.7*   < > 5.9*    < > = values in this interval not displayed.      CARDIAC: No results for input(s): \"LDH\", \"CKMB\", \"TROPHS\", \"BNP\" in the last 32779 hours.    No lab exists for component: \"CK\", \"CKMBP\"  Recent Labs     06/04/24  1558 04/11/23  1503 09/28/22  1058 07/05/22  1152   CHOL 164 177 168 152   LDLF  --  97 90 77   LDLCALC 83  --   --   --    HDL 58.1 70.2 65.1 62.7   TRIG 116 51 67 61     No data recorded    Current Medications:  Current Outpatient Medications   Medication Sig Dispense Refill    amLODIPine (Norvasc) 10 mg tablet Take 1 tablet (10 mg) by mouth once daily. 90 tablet 3    Auvelity  mg tablet, IR and ER, biphasic Take 1 tablet by mouth once daily.      chlorthalidone (Hygroton) 25 mg tablet Take 1 tablet (25 mg) by mouth once daily. 90 tablet 3    clobetasol (Temovate) 0.05 % ointment Apply topically 2 times a day.      cloNIDine (Catapres) 0.1 mg tablet Take 1 tablet (0.1 mg) by mouth 2 times a day.      hydrOXYzine HCL (Atarax) 25 mg tablet Take 1 tablet (25 mg) by mouth once daily as needed for anxiety. 90 tablet 3    levothyroxine (Synthroid, Levoxyl) 50 mcg tablet Take 1 tablet (50 mcg) by mouth once daily. 90 tablet 3    nebivolol (Bystolic) 10 mg tablet Take 1 tablet (10 mg) by mouth once daily. 90 tablet 1    NON FORMULARY Ketamine infusion once monthly for depression       No current facility-administered medications for this visit.       Assessment and Plan  Devora was seen today for follow-up and back pain.  Diagnoses and all orders for this visit:  Primary hypothyroidism (Primary)  -     CBC; Future  -     Comprehensive Metabolic Panel; Future  -     TSH with reflex to Free T4 if abnormal; Future  -     Hemoglobin " A1C; Future  -     Lipid Panel; Future  -     Vitamin D 25-Hydroxy,Total (for eval of Vitamin D levels); Future  -     Vitamin B12; Future  -     Folate; Future  -     Ferritin; Future  Benign essential HTN  -     CBC; Future  -     Comprehensive Metabolic Panel; Future  -     TSH with reflex to Free T4 if abnormal; Future  -     Hemoglobin A1C; Future  -     Lipid Panel; Future  -     Vitamin D 25-Hydroxy,Total (for eval of Vitamin D levels); Future  -     Vitamin B12; Future  -     Folate; Future  -     Ferritin; Future  Depression, unspecified depression type  -     CBC; Future  -     Comprehensive Metabolic Panel; Future  -     TSH with reflex to Free T4 if abnormal; Future  -     Hemoglobin A1C; Future  -     Lipid Panel; Future  -     Vitamin D 25-Hydroxy,Total (for eval of Vitamin D levels); Future  -     Vitamin B12; Future  -     Folate; Future  -     Ferritin; Future  Hypovitaminosis  -     CBC; Future  -     Comprehensive Metabolic Panel; Future  -     TSH with reflex to Free T4 if abnormal; Future  -     Hemoglobin A1C; Future  -     Lipid Panel; Future  -     Vitamin D 25-Hydroxy,Total (for eval of Vitamin D levels); Future  -     Vitamin B12; Future  -     Folate; Future  -     Ferritin; Future  Back pain, unspecified back location, unspecified back pain laterality, unspecified chronicity  -     XR lumbar spine 2-3 views; Future  -     Urinalysis with Reflex Microscopic; Future     Low back pain  Likely due to strain of the muscles  Get Xrays  Check UA    HTN  Stable  Continue with current  Takes Chlorthalidone only as needed    Depression  Resistant to medical therapy  Was on ketamine infusions  Taking a break from them  Holiday season is rough  Talk with the therapist regularly  May try bright light therapy daily  Be careful about Benzodiazepne use, highly addictive  Exercise regularly      By optimizing your health through good nutrition, daily exercise, stress management, low/moderate alcohol and  avoidance of tobacco, sugar and processed foods, you can help to decrease your risk of cardiovascular disease and stroke and achieve a healthy weight. A diet rich in whole, plant-based foods such as vegetables, beans, seeds, nuts, whole grains, healthy fats and fruit - leads to lower body mass index, blood pressure, HbA1C, and cholesterol levels.         Immunizations:  Immunization History   Administered Date(s) Administered    COVID-19, mRNA, LNP-S, PF, 30 mcg/0.3 mL dose 04/06/2021, 04/28/2021, 12/03/2021    Flu vaccine (IIV4), preservative free *Check age/dose* 10/08/2021    Flu vaccine, quadrivalent, no egg protein, age 6 month or greater (FLUCELVAX) 10/18/2018, 09/30/2020, 11/11/2022    Flu vaccine, trivalent, preservative free, age 6 months and greater (Fluarix/Fluzone/Flulaval) 10/09/2024    HPV, Quadrivalent 07/05/2007, 09/07/2007    Hepatitis A vaccine, pediatric/adolescent (HAVRIX, VAQTA) 10/23/2007    Hepatitis B vaccine, 19 yrs and under (RECOMBIVAX, ENGERIX) 05/23/1996, 07/11/1996, 11/17/1996    Influenza, Unspecified 09/24/2009, 10/15/2022    Influenza, injectable, quadrivalent 10/18/2018, 09/30/2020    Influenza, seasonal, injectable 09/30/2015, 12/02/2016, 11/13/2019    Pfizer COVID-19 vaccine, bivalent, age 12 years and older (30 mcg/0.3 mL) 11/23/2022    Pfizer Gray Cap SARS-CoV-2 08/10/2022, 08/10/2022    Polio, Unspecified 1992, 1992, 04/13/1993, 11/18/1993, 02/20/1997    Tdap vaccine, age 7 year and older (BOOSTRIX, ADACEL) 08/13/2010, 11/01/2023

## 2025-01-04 NOTE — RESULT ENCOUNTER NOTE
Vitamin D is low  Start taking 5000 units daily  Iron levels are low  Start over the counter iron supplements

## 2025-01-08 ENCOUNTER — HOSPITAL ENCOUNTER (OUTPATIENT)
Dept: RADIOLOGY | Facility: CLINIC | Age: 35
Discharge: HOME | End: 2025-01-08
Payer: COMMERCIAL

## 2025-01-08 DIAGNOSIS — M54.9 BACK PAIN, UNSPECIFIED BACK LOCATION, UNSPECIFIED BACK PAIN LATERALITY, UNSPECIFIED CHRONICITY: ICD-10-CM

## 2025-01-08 PROCEDURE — 72110 X-RAY EXAM L-2 SPINE 4/>VWS: CPT

## 2025-02-25 ENCOUNTER — APPOINTMENT (OUTPATIENT)
Dept: PRIMARY CARE | Facility: CLINIC | Age: 35
End: 2025-02-25
Payer: COMMERCIAL

## 2025-02-25 VITALS
SYSTOLIC BLOOD PRESSURE: 177 MMHG | BODY MASS INDEX: 38.04 KG/M2 | DIASTOLIC BLOOD PRESSURE: 110 MMHG | WEIGHT: 208 LBS | HEART RATE: 69 BPM

## 2025-02-25 DIAGNOSIS — I10 BENIGN ESSENTIAL HTN: ICD-10-CM

## 2025-02-25 DIAGNOSIS — R26.89 BALANCE DISORDER: ICD-10-CM

## 2025-02-25 DIAGNOSIS — F33.9 RECURRENT MAJOR DEPRESSIVE DISORDER, REMISSION STATUS UNSPECIFIED (CMS-HCC): Primary | ICD-10-CM

## 2025-02-25 PROCEDURE — 3077F SYST BP >= 140 MM HG: CPT | Performed by: INTERNAL MEDICINE

## 2025-02-25 PROCEDURE — 3080F DIAST BP >= 90 MM HG: CPT | Performed by: INTERNAL MEDICINE

## 2025-02-25 PROCEDURE — 1036F TOBACCO NON-USER: CPT | Performed by: INTERNAL MEDICINE

## 2025-02-25 PROCEDURE — 99214 OFFICE O/P EST MOD 30 MIN: CPT | Performed by: INTERNAL MEDICINE

## 2025-02-25 ASSESSMENT — ENCOUNTER SYMPTOMS
SHORTNESS OF BREATH: 0
DIFFICULTY URINATING: 0
ABDOMINAL PAIN: 0
FATIGUE: 0
FEVER: 0

## 2025-02-25 ASSESSMENT — PATIENT HEALTH QUESTIONNAIRE - PHQ9
1. LITTLE INTEREST OR PLEASURE IN DOING THINGS: NOT AT ALL
2. FEELING DOWN, DEPRESSED OR HOPELESS: NOT AT ALL
SUM OF ALL RESPONSES TO PHQ9 QUESTIONS 1 AND 2: 0

## 2025-02-25 NOTE — PROGRESS NOTES
Subjective   Patient ID: Devora Arce is a 35 y.o. female who presents for Follow-up (Has some concerns).    HPI   Patient presented for follow up and had elevated blood pressure of 178/118. On repeat measurement it was still elevated at 177/110. She reports that she has not been taking her medications as prescribed and sometimes forgets to take it. She has stopped taking chlorthalidone altogether because it interferes with her work because it is a diuretic. She was also stressed because of midterms, family and her job. She was tearful but willing to address her issues and overcome them. She also says that her sense of spatial awareness has been off lately and tends to dissociate and has to bring herself back which happens even when she is driving.    Review of Systems   Constitutional:  Negative for fatigue and fever.   Respiratory:  Negative for shortness of breath.    Cardiovascular:  Negative for chest pain.   Gastrointestinal:  Negative for abdominal pain.   Genitourinary:  Negative for difficulty urinating.       Objective   BP (!) 177/110 (BP Location: Right arm, Patient Position: Sitting, BP Cuff Size: Large adult)   Pulse 69   Wt 94.3 kg (208 lb)   BMI 38.04 kg/m²     Physical Exam  Vitals and nursing note reviewed.   HENT:      Head: Normocephalic.      Nose: Nose normal.      Mouth/Throat:      Mouth: Mucous membranes are moist.   Eyes:      Pupils: Pupils are equal, round, and reactive to light.   Cardiovascular:      Rate and Rhythm: Normal rate and regular rhythm.      Pulses: Normal pulses.      Heart sounds: Normal heart sounds.   Pulmonary:      Effort: Pulmonary effort is normal.   Abdominal:      General: Bowel sounds are normal.      Palpations: Abdomen is soft.   Skin:     General: Skin is warm and dry.   Neurological:      General: No focal deficit present.      Mental Status: She is alert.   Psychiatric:      Comments: Sad/Tearful         Assessment/Plan   Diagnoses and all orders for  this visit:  Recurrent major depressive disorder, remission status unspecified (CMS-HCC)  -     Referral to Psychiatry; Future  Balance disorder  -     Referral to Neurology; Future  Benign essential HTN    34 y/o Female w/ PMH of HTN, Depression, Hypothyroidism, Hypovitaminosis and Chronic lower back pain presented for follow up with markedly elevated blood pressure of 178/118. She reports that she hasn't been compliant with her medications and has missed several doses. She has stopped taking Chlorthalidone altogether because it interferes with her work since it's a diuretic. She was also stressed because of midterms, family and her job. She was tearful but willing to address her issues and overcome them. Put in Psychiatry referral. She also says that her sense of spatial awareness has been off lately and tends to dissociate and has to bring herself back which happens even when she is driving. Referral to Neurology sent.    #HTN  - Advised about need for medication adherence. Patient reciprocated understanding  - Resume Amlodipine, Chlorthalidone and Nebivolol    #Depression/Anxiety  - Referral to Psychiatry    #Spatial awareness issue  #Dissociation  - Neurology Referral    Follow up in 6 weeks    Dago Ro MD  Internal Medicine, PGY-II

## 2025-03-17 ENCOUNTER — HOSPITAL ENCOUNTER (OUTPATIENT)
Dept: RADIOLOGY | Facility: HOSPITAL | Age: 35
Discharge: HOME | End: 2025-03-17
Payer: COMMERCIAL

## 2025-03-17 DIAGNOSIS — I51.89 FAMILIAL HEART DISEASE: ICD-10-CM

## 2025-03-17 PROCEDURE — 75571 CT HRT W/O DYE W/CA TEST: CPT

## 2025-04-04 ENCOUNTER — APPOINTMENT (OUTPATIENT)
Dept: PRIMARY CARE | Facility: CLINIC | Age: 35
End: 2025-04-04
Payer: COMMERCIAL

## 2025-04-04 VITALS
DIASTOLIC BLOOD PRESSURE: 76 MMHG | BODY MASS INDEX: 38.83 KG/M2 | HEIGHT: 62 IN | SYSTOLIC BLOOD PRESSURE: 118 MMHG | HEART RATE: 66 BPM | WEIGHT: 211 LBS

## 2025-04-04 DIAGNOSIS — N28.1 RENAL CYST: Primary | ICD-10-CM

## 2025-04-04 DIAGNOSIS — E55.9 VITAMIN D DEFICIENCY: ICD-10-CM

## 2025-04-04 DIAGNOSIS — I10 BENIGN ESSENTIAL HTN: ICD-10-CM

## 2025-04-04 PROCEDURE — 3008F BODY MASS INDEX DOCD: CPT | Performed by: INTERNAL MEDICINE

## 2025-04-04 PROCEDURE — 3074F SYST BP LT 130 MM HG: CPT | Performed by: INTERNAL MEDICINE

## 2025-04-04 PROCEDURE — 3078F DIAST BP <80 MM HG: CPT | Performed by: INTERNAL MEDICINE

## 2025-04-04 PROCEDURE — 1036F TOBACCO NON-USER: CPT | Performed by: INTERNAL MEDICINE

## 2025-04-04 PROCEDURE — 99214 OFFICE O/P EST MOD 30 MIN: CPT | Performed by: INTERNAL MEDICINE

## 2025-04-04 RX ORDER — ACETAMINOPHEN 500 MG
5000 TABLET ORAL DAILY
Qty: 30 TABLET | Refills: 2 | Status: SHIPPED | OUTPATIENT
Start: 2025-04-04 | End: 2025-07-03

## 2025-04-04 RX ORDER — CHLORTHALIDONE 25 MG/1
25 TABLET ORAL DAILY
Qty: 90 TABLET | Refills: 3 | Status: SHIPPED | OUTPATIENT
Start: 2025-04-04

## 2025-04-04 ASSESSMENT — LIFESTYLE VARIABLES
HOW OFTEN DO YOU HAVE SIX OR MORE DRINKS ON ONE OCCASION: NEVER
AUDIT-C TOTAL SCORE: 0
HOW OFTEN DO YOU HAVE A DRINK CONTAINING ALCOHOL: NEVER
HOW MANY STANDARD DRINKS CONTAINING ALCOHOL DO YOU HAVE ON A TYPICAL DAY: PATIENT DOES NOT DRINK
SKIP TO QUESTIONS 9-10: 1

## 2025-04-04 ASSESSMENT — PATIENT HEALTH QUESTIONNAIRE - PHQ9
2. FEELING DOWN, DEPRESSED OR HOPELESS: NOT AT ALL
1. LITTLE INTEREST OR PLEASURE IN DOING THINGS: NOT AT ALL
SUM OF ALL RESPONSES TO PHQ9 QUESTIONS 1 AND 2: 0

## 2025-04-04 ASSESSMENT — ENCOUNTER SYMPTOMS
DYSPHORIC MOOD: 1
DECREASED CONCENTRATION: 1

## 2025-04-04 NOTE — PROGRESS NOTES
"Subjective   Patient ID: Devora Arce is a 35 y.o. female with pmhx HTN, depression/anxiety, obesity, hypothyroidism, vitamin D deficiency who presents for follow-up.    HPI   She is still having some dissociative symptoms. She says she feels a step removed from reality. She notices it more so during depressive moments, sometimes when she's driving, happening at least once per week. She thinks it's about the same as it has been prior to last appt. She takes Auvelity and thinks it's helping somewhat. She denies suicidal/homicidal ideation. She reports no significant change from last visit. She used to get ketamine infusions but stopped them as she had some side effects including irritability.    She is going to see neurology later this month. She is trying to transition to different psychiatrist.    She reports she has been taking her BP medication regularly. She does not check it super regularly at home but when she does it is around 110-120s/70s.    She does report some back pain that she thinks is related to her hx of kidney cysts. Happens on and off, not debilitating, she is able to ignore it and isn't overly concerned at this time.    Review of Systems   Psychiatric/Behavioral:  Positive for decreased concentration and dysphoric mood. Negative for suicidal ideas.    All other systems reviewed and are negative.      Objective   /76 (BP Location: Left arm, Patient Position: Sitting, BP Cuff Size: Large adult)   Pulse 66   Ht 1.575 m (5' 2\")   Wt 95.7 kg (211 lb)   BMI 38.59 kg/m²     Physical Exam  Constitutional:       General: She is not in acute distress.  HENT:      Head: Normocephalic.      Mouth/Throat:      Mouth: Mucous membranes are moist.   Eyes:      Extraocular Movements: Extraocular movements intact.      Conjunctiva/sclera: Conjunctivae normal.   Cardiovascular:      Rate and Rhythm: Normal rate and regular rhythm.      Pulses: Normal pulses.      Heart sounds: Normal heart sounds. "   Pulmonary:      Effort: Pulmonary effort is normal.      Breath sounds: Normal breath sounds.   Abdominal:      General: Bowel sounds are normal.      Palpations: Abdomen is soft.   Musculoskeletal:      Right lower leg: No edema.      Left lower leg: No edema.   Skin:     General: Skin is warm and dry.   Neurological:      General: No focal deficit present.      Mental Status: She is alert.   Psychiatric:         Mood and Affect: Affect is flat.         Assessment/Plan   Diagnoses and all orders for this visit:  Benign essential HTN  -     chlorthalidone (Hygroton) 25 mg tablet; Take 1 tablet (25 mg) by mouth once daily.  MDD  -She follows with therapy, will attempt to get her info released so we can see the notes   Vitamin D deficiency  - 5,000 units daily sent to pharmacy  Hx renal cysts  -Renal US ordered  HM  -Labs UTD  -Pap deferred per pt preference at this time, will discuss again at next visit    Susie Vallecillo DO  [facility] PGY-`1

## 2025-04-04 NOTE — PROGRESS NOTES
I reviewed the resident/fellow's documentation and discussed the patient with the resident/fellow. I agree with the resident/fellow's medical decision making as documented in the note.  As the attending physician, I certify that I personally reviewed the patient's history and personally examined the patient to confirm the physical findings described above, and that I reviewed the relevant imaging studies and available reports. I also discussed the differential diagnosis and all of the proposed management plans with the patient and individuals accompanying the patient to this visit. They had the opportunity to ask questions about the proposed management plans and to have those questions answered.     Autumn Dangelo MD

## 2025-04-14 ENCOUNTER — APPOINTMENT (OUTPATIENT)
Dept: RADIOLOGY | Facility: HOSPITAL | Age: 35
End: 2025-04-14
Payer: COMMERCIAL

## 2025-04-15 ENCOUNTER — HOSPITAL ENCOUNTER (OUTPATIENT)
Dept: RADIOLOGY | Facility: HOSPITAL | Age: 35
Discharge: HOME | End: 2025-04-15
Payer: COMMERCIAL

## 2025-04-15 DIAGNOSIS — N28.1 RENAL CYST: ICD-10-CM

## 2025-04-15 PROCEDURE — 76770 US EXAM ABDO BACK WALL COMP: CPT | Performed by: RADIOLOGY

## 2025-04-15 PROCEDURE — 76770 US EXAM ABDO BACK WALL COMP: CPT

## 2025-04-16 DIAGNOSIS — N28.1 KIDNEY CYSTS: Primary | ICD-10-CM

## 2025-04-16 NOTE — RESULT ENCOUNTER NOTE
Jovon Lozoya,  I reviewed your CT results.  I recommend that you follow up with urologist to discuss the findings.  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  I will place a referral.    Best,  Dr. Leyva

## 2025-04-22 ENCOUNTER — OFFICE VISIT (OUTPATIENT)
Dept: NEUROLOGY | Facility: CLINIC | Age: 35
End: 2025-04-22
Payer: COMMERCIAL

## 2025-04-22 ENCOUNTER — APPOINTMENT (OUTPATIENT)
Dept: PRIMARY CARE | Facility: CLINIC | Age: 35
End: 2025-04-22
Payer: COMMERCIAL

## 2025-04-22 VITALS
RESPIRATION RATE: 9 BRPM | DIASTOLIC BLOOD PRESSURE: 93 MMHG | BODY MASS INDEX: 40.48 KG/M2 | TEMPERATURE: 97.2 F | WEIGHT: 220 LBS | HEART RATE: 59 BPM | HEIGHT: 62 IN | SYSTOLIC BLOOD PRESSURE: 167 MMHG

## 2025-04-22 VITALS
SYSTOLIC BLOOD PRESSURE: 144 MMHG | HEIGHT: 62 IN | HEART RATE: 78 BPM | DIASTOLIC BLOOD PRESSURE: 99 MMHG | BODY MASS INDEX: 39.2 KG/M2 | WEIGHT: 213 LBS

## 2025-04-22 DIAGNOSIS — R27.9 INCOORDINATION: ICD-10-CM

## 2025-04-22 DIAGNOSIS — R41.3 MEMORY IMPAIRMENT: Primary | ICD-10-CM

## 2025-04-22 DIAGNOSIS — F33.2 MAJOR DEPRESSIVE DISORDER, RECURRENT SEVERE WITHOUT PSYCHOTIC FEATURES (MULTI): ICD-10-CM

## 2025-04-22 DIAGNOSIS — F32.2 SEVERE DEPRESSION (MULTI): ICD-10-CM

## 2025-04-22 PROCEDURE — 99214 OFFICE O/P EST MOD 30 MIN: CPT | Performed by: INTERNAL MEDICINE

## 2025-04-22 PROCEDURE — G8433 SCR FOR DEP NOT CPT DOC RSN: HCPCS | Performed by: PSYCHIATRY & NEUROLOGY

## 2025-04-22 PROCEDURE — 3077F SYST BP >= 140 MM HG: CPT | Performed by: INTERNAL MEDICINE

## 2025-04-22 PROCEDURE — 99205 OFFICE O/P NEW HI 60 MIN: CPT | Performed by: PSYCHIATRY & NEUROLOGY

## 2025-04-22 PROCEDURE — 3077F SYST BP >= 140 MM HG: CPT | Performed by: PSYCHIATRY & NEUROLOGY

## 2025-04-22 PROCEDURE — 3080F DIAST BP >= 90 MM HG: CPT | Performed by: PSYCHIATRY & NEUROLOGY

## 2025-04-22 PROCEDURE — 1036F TOBACCO NON-USER: CPT | Performed by: PSYCHIATRY & NEUROLOGY

## 2025-04-22 PROCEDURE — 3008F BODY MASS INDEX DOCD: CPT | Performed by: PSYCHIATRY & NEUROLOGY

## 2025-04-22 PROCEDURE — 3080F DIAST BP >= 90 MM HG: CPT | Performed by: INTERNAL MEDICINE

## 2025-04-22 PROCEDURE — 3008F BODY MASS INDEX DOCD: CPT | Performed by: INTERNAL MEDICINE

## 2025-04-22 PROCEDURE — 1036F TOBACCO NON-USER: CPT | Performed by: INTERNAL MEDICINE

## 2025-04-22 ASSESSMENT — LIFESTYLE VARIABLES
SKIP TO QUESTIONS 9-10: 1
HOW OFTEN DO YOU HAVE SIX OR MORE DRINKS ON ONE OCCASION: NEVER
HOW OFTEN DO YOU HAVE A DRINK CONTAINING ALCOHOL: NEVER
HOW MANY STANDARD DRINKS CONTAINING ALCOHOL DO YOU HAVE ON A TYPICAL DAY: PATIENT DOES NOT DRINK
AUDIT-C TOTAL SCORE: 0

## 2025-04-22 ASSESSMENT — ENCOUNTER SYMPTOMS
SHORTNESS OF BREATH: 0
FEVER: 0
DIFFICULTY URINATING: 0
LOSS OF SENSATION IN FEET: 0
ABDOMINAL PAIN: 0
FATIGUE: 0
DEPRESSION: 0
OCCASIONAL FEELINGS OF UNSTEADINESS: 1

## 2025-04-22 ASSESSMENT — PAIN SCALES - GENERAL: PAINLEVEL_OUTOF10: 0-NO PAIN

## 2025-04-22 NOTE — PROGRESS NOTES
"Consultation:   Subjective      Devora Arce is a 35 y.o. year old female is here for consult for imbalance.     Referred by Autumn Dangelo MD      HPI  She is more bothered by memory issues.  She feels she has dissociation and memory issues. She feels like she is sitting and watching her life on and off for a few years.  She says she has slight balance issues.  She tilts more in the shower.  She feels less coordination.   She is both working and in school.  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   She has \"bizarre episodes\".  When she was younger she would be on the phone and would feel like she could not understand her.  She thinks she was 14 or 15 years old.   She hit her head a lot as kid. She had self injurious phases when banging her head on the walls.   She has had depression and is on Auvelity for the past year and is helping.  She sees psychiatry private practice.    FH: father had parkinsonism and dementia. Paternal grandmother has alzheimer's disease.  Review of Systems    Problem List[1]  Medical History[2]  Surgical History[3]  Social History     Tobacco Use    Smoking status: Never     Passive exposure: Past    Smokeless tobacco: Never   Substance Use Topics    Alcohol use: Never     family history is not on file.  Current Medications[4]  Allergies[5]  BP (!) 167/93 (BP Location: Right arm, Patient Position: Sitting)   Pulse 59   Temp 36.2 °C (97.2 °F)   Resp (!) 9   Ht 1.575 m (5' 2\")   Wt 99.8 kg (220 lb)   BMI 40.24 kg/m²   Neurological Exam/Physical Exam:    Constitutional: General appearance: no acute distress. Pleasant.  Obese   Auscultation of Heart: Regular rate and rhythm, no murmurs, normal S1 and S2.   Carotid Arteries: no bruits  Peripheral Vascular Exam: No swelling in extremities  Mental status: no distress, alert, interactive and cooperative. Affect is appropriate.   Fluent speech.   Language: normal comprehension "   Fund of knowledge: Patient displays adequate knowledge of current events.  Eyes: The ophthalmoscopic examination was normal.   Cranial nerve II: Visual fields full to confrontation.   Cranial nerves III, IV, and VI: Pupils round, equally reactive to light; EOMs intact. No nystagmus.   Cranial Nerve V: Facial sensation intact to LT bilaterally.   Cranial nerve VII: no facial droop  Cranial nerve VIII: Hearing is intact  Cranial nerves IX and X: Palate elevates symmetrically.   Cranial nerve XI: Shoulder shrug intact.   Cranial nerve XII: Tongue is midline.  Motor:  Muscle bulk was normal in both upper and lower extremities.    No atrophy.   Strength is normal.   Deep Tendon Reflexes: left biceps 2+ , right biceps 2+, left triceps 2+, right triceps 2+, left brachioradialis 2+, right brachioradialis 2+, left patella 2+, right patella 2+, left ankle jerk 2+, right ankle jerk 2+   Plantar Reflex: Toes downgoing to plantar stimulation on the left. Toes downgoing to plantar stimulation on the right.   Sensory Exam: Normal to vibratory sensation  Coordination:  no limb dystaxia and rapid alternating movements are intact.   Gait:  cautious.         Labs:  CBC:   Lab Results   Component Value Date    WBC 5.8 01/03/2025    HGB 12.6 01/03/2025    HCT 39.6 01/03/2025     01/03/2025     BMP:   Lab Results   Component Value Date     01/03/2025    K 3.9 01/03/2025     01/03/2025    CO2 27 01/03/2025    BUN 11 01/03/2025    CREATININE 0.82 01/03/2025    CALCIUM 9.2 01/03/2025     LFT:   Lab Results   Component Value Date    ALKPHOS 72 01/03/2025    BILITOT 0.4 01/03/2025    PROT 6.9 01/03/2025    ALBUMIN 4.0 01/03/2025    ALT 13 01/03/2025    AST 13 01/03/2025       Labs were viewed personally.   Kidney function normal.  CBC shows no signs of anemia.  Electrolytes normal.  LFTS are also unremarkable.   TSH WNL , B12 WNL     Assessment/Plan   Problem List Items Addressed This Visit    None  Visit Diagnoses          Codes      Memory impairment    -  Primary R41.3        Obtain MRI brain to rule out structural lesion such as demyelinating disease.   Cognitive issues are most likely related to see sleep and depression.  Consider neuropsychology testing later on depending on symptoms.          [1]   Patient Active Problem List  Diagnosis    Hypertension    Depression    Abnormal stool color    Allergic rhinitis    Anemia    Benign essential HTN    Bilateral carpal tunnel syndrome    Bilateral leg edema    Change in bowel habits    Chest pain    Low back pain    Contact dermatitis    Dysmenorrhea    Elevated hemoglobin A1c    Enlarged uterus    Fibroid    Hemorrhoids    Hypokalemia    Irregular menstrual cycle    Kidney cyst, acquired    Left ankle pain    Loose stools    Lower abdominal pain    Obesity    Other specified anxiety disorders    Stress disorder, post traumatic    Plantar fasciitis    PMS (premenstrual syndrome)    Primary hypothyroidism    Sebaceous cyst of left axilla    Shoulder joint stiffness    Skin rash    Suicidal ideations    Thyromegaly    Tinea corporis    Ultrasound scan abnormal    Vertigo    Vitamin D deficiency    MDD (major depressive disorder)    Severe depression (Multi)    Nausea    RACHELL (generalized anxiety disorder)    Insomnia due to mental condition    Abnormal echocardiogram    Familial heart disease    Balance disorder   [2]   Past Medical History:  Diagnosis Date    Depression, unspecified 09/14/2022    Depression    Other conditions influencing health status     Nulliparity    Pain in unspecified ankle and joints of unspecified foot 10/15/2015    Ankle pain    Personal history of diseases of the skin and subcutaneous tissue     History of hyperpigmentation of skin    Personal history of other benign neoplasm     History of uterine fibroid    Personal history of other diseases of the circulatory system 12/19/2014    History of essential hypertension    Personal history of other diseases of  the female genital tract 06/04/2013    History of vaginitis    Personal history of other specified conditions 07/20/2015    History of insomnia    Snoring     Snoring   [3]   Past Surgical History:  Procedure Laterality Date    ADENOIDECTOMY  01/27/2014    Adenoidectomy    APPENDECTOMY  01/27/2014    Appendectomy    TONSILLECTOMY  01/27/2014    Tonsillectomy   [4]   Current Outpatient Medications:     amLODIPine (Norvasc) 10 mg tablet, Take 1 tablet (10 mg) by mouth once daily., Disp: 90 tablet, Rfl: 3    Auvelity  mg tablet, IR and ER, biphasic, Take 1 tablet by mouth once daily., Disp: , Rfl:     chlorthalidone (Hygroton) 25 mg tablet, Take 1 tablet (25 mg) by mouth once daily., Disp: 90 tablet, Rfl: 3    cholecalciferol (Vitamin D-3) 5,000 Units tablet, Take 1 tablet (5,000 Units) by mouth once daily., Disp: 30 tablet, Rfl: 2    clobetasol (Temovate) 0.05 % ointment, Apply topically 2 times a day., Disp: , Rfl:     hydrOXYzine HCL (Atarax) 25 mg tablet, Take 1 tablet (25 mg) by mouth once daily as needed for anxiety., Disp: 90 tablet, Rfl: 3    levothyroxine (Synthroid, Levoxyl) 50 mcg tablet, Take 1 tablet (50 mcg) by mouth once daily., Disp: 90 tablet, Rfl: 3    nebivolol (Bystolic) 10 mg tablet, Take 1 tablet (10 mg) by mouth once daily., Disp: 90 tablet, Rfl: 1    cloNIDine (Catapres) 0.1 mg tablet, Take 1 tablet (0.1 mg) by mouth 2 times a day. (Patient not taking: Reported on 4/22/2025), Disp: , Rfl:     NON FORMULARY, Ketamine infusion once monthly for depression (Patient not taking: Reported on 4/22/2025), Disp: , Rfl:   [5]   Allergies  Allergen Reactions    Bee Pollen Unknown    House Dust Unknown     Sneezing, stuffy nose

## 2025-04-22 NOTE — PATIENT INSTRUCTIONS
"It was a pleasure seeing you today.     I want you to get a MRI Brain- Please call radiology to schedule at [phone].   If the results are abnormal, I will call you to discuss.     For any urgent issues or needing to speak to a medical assistant please call [phone], option 6 during our office hours Monday-Friday 8am-4pm, and leave a voicemail with your concern.  My office will try to reach back you as soon as possible within 24 (business) hours.  If you have an emergency please call 911 or visit a local urgent care or nearest emergency room.      Please understand that [employer] is a useful communication tool for simple \"normal\" results or a refill request but I would not recommend using this tool for emergent or urgent issues or for conversations with me.  I am happy to ask my staff to rearrange a follow up visit or a virtual visit sooner than requested if appropriate for your care.    "

## 2025-04-22 NOTE — PROGRESS NOTES
Chief Complaint:   Chief Complaint   Patient presents with    Follow-up      HPI    Patient comes in for a physical exam last one done over a year ago , doing well over-all with no particular complaints. Also is in for laboratory review and health maintenance update.  Updating family history as well.  Interval event - past medical history, surgical, social, and family history reviewed and updated.  Interval care -  Patient is    up to date with dental care.  Patient does     receive routine vision care.     Past Medical History   Medical History[1]   Past Surgical History:   Surgical History[2]  Family History:   Family History[3]  Social History:   Tobacco Use: Low Risk  (4/22/2025)    Patient History     Smoking Tobacco Use: Never     Smokeless Tobacco Use: Never     Passive Exposure: Past      Social History     Substance and Sexual Activity   Alcohol Use Never        Allergies:   RX Allergies[4]     ROS   Review of Systems   Constitutional:  Negative for fatigue and fever.   Respiratory:  Negative for shortness of breath.    Cardiovascular:  Negative for chest pain.   Gastrointestinal:  Negative for abdominal pain.   Genitourinary:  Negative for difficulty urinating.        Objective   Vitals:    04/22/25 1419   BP: (!) 144/99   Pulse: 78      BMI Readings from Last 15 Encounters:   04/22/25 38.96 kg/m²   04/22/25 40.24 kg/m²   04/04/25 38.59 kg/m²   02/25/25 38.04 kg/m²   01/03/25 37.13 kg/m²   10/30/24 37.86 kg/m²   10/09/24 37.68 kg/m²   06/04/24 38.23 kg/m²   03/27/24 38.41 kg/m²   02/07/24 39.14 kg/m²   11/13/23 38.96 kg/m²   11/01/23 38.41 kg/m²   08/25/23 41.34 kg/m²   07/26/23 39.31 kg/m²   05/10/23 40.79 kg/m²      96.6 kg (213 lb) (4/22/2025  2:19 PM)      Physical Exam  Physical Exam  Vitals and nursing note reviewed.   HENT:      Head: Normocephalic.      Nose: Nose normal.      Mouth/Throat:      Mouth: Mucous membranes are moist.   Eyes:      Pupils: Pupils are equal, round, and reactive to light.  "  Cardiovascular:      Rate and Rhythm: Normal rate and regular rhythm.      Pulses: Normal pulses.      Heart sounds: Normal heart sounds.   Pulmonary:      Effort: Pulmonary effort is normal.   Abdominal:      General: Bowel sounds are normal.      Palpations: Abdomen is soft.   Skin:     General: Skin is warm and dry.   Neurological:      General: No focal deficit present.      Mental Status: She is alert.          Labs:  CBC:  Recent Labs     01/03/25  1420 06/04/24  1558 11/01/23  1136   WBC 5.8 7.2 5.6   HGB 12.6 11.3* 11.9*   HCT 39.6 35.3* 39.8    375 484*   MCV 82 81 83     CMP:  Recent Labs     01/03/25  1420 06/04/24  1558 02/07/24  1223    140 141   K 3.9 4.0 3.6    104 100   CO2 27 28 30   ANIONGAP 13 12 15   BUN 11 13 20   CREATININE 0.82 0.98 0.93   EGFR >90 79 84   GLUCOSE 90 97 87     Recent Labs     01/03/25  1420 06/04/24  1558 02/07/24  1223   ALBUMIN 4.0 3.8 4.0   ALKPHOS 72 69 64   ALT 13 10 13   AST 13 13 14   BILITOT 0.4 0.3 0.3     Calcium/Phos:   Lab Results   Component Value Date    CALCIUM 9.2 01/03/2025      COAG: No results for input(s): \"INR\", \"DDIMERVTE\" in the last 65043 hours.  CRP: No results found for: \"CRP\"   [unfilled]   ENDO:  Recent Labs     01/03/25  1420 06/04/24  1558 11/01/23  1136 04/11/23  1503 09/28/22  1058   TSH 2.28 0.82  --  0.75 3.15   HGBA1C 5.5 5.4 5.5 5.7* 5.7*      CARDIAC: No results for input(s): \"LDH\", \"CKMB\", \"TROPHS\", \"BNP\" in the last 87655 hours.    No lab exists for component: \"CK\", \"CKMBP\"  Recent Labs     01/03/25  1420 06/04/24  1558 04/11/23  1503 09/28/22  1058 07/05/22  1152   CHOL 189 164 177 168 152   LDLF  --   --  97 90 77   LDLCALC 104* 83  --   --   --    HDL 70.5 58.1 70.2 65.1 62.7   TRIG 71 116 51 67 61     No data recorded    Current Medications:  Current Medications[5]    Assessment and Plan  Devora was seen today for follow-up.  Diagnoses and all orders for this visit:  Severe depression (Multi)  Major " depressive disorder, recurrent severe without psychotic features (Multi)     34 y/o Female w/ PMH of HTN, Depression, Hypothyroidism, Hypovitaminosis and Chronic lower back pain presents for follow up. She had dissociative symptoms she complained about last visit and saw neurology today who recommended MRI brain, to be scheduled. Otherwise she seems to be doing much better. She is compliant with her medications. She is up to date on her screenings.    Follow up in 3 months    Patient on examination is in good health , screening blood tests to screen for high cholesterol, diabetes, thyroid,   Patient should be taking enough calcium in a balanced diet or supplements to total 1200 mg a day in divided doses unless with history of specific types of kidney stones.   Vitamin D 1066-4098 iu a day.    For Female Patients Only: PAP test  as per gynecology   Preventive Medicine: colon cancer screening by age 45 if no family history, balanced diet, and exercise as discussed.   Seat belt use for injury prevention   Substance use and /or tobacco use not applicable / counseled when applicable. I  Immunizations  up to date.   Yearly flu vaccine unless contraindicated .   More than 50% of office visit time spent counseling the patient, questions were answered. Complete physical examination in a year.   Patient knows either has access to  [employer] to see results and messages regarding these or will call us if cannot see them      Dago Ro MD  Internal Medicine, PGY-II    Immunizations:  Immunization History   Administered Date(s) Administered    COVID-19, mRNA, LNP-S, PF, 30 mcg/0.3 mL dose 04/06/2021, 04/28/2021, 12/03/2021    Flu vaccine (IIV4), preservative free *Check age/dose* 10/08/2021    Flu vaccine, quadrivalent, no egg protein, age 6 month or greater (FLUCELVAX) 10/18/2018, 09/30/2020, 11/11/2022    Flu vaccine, trivalent, preservative free, age 6 months and greater (Fluarix/Fluzone/Flulaval) 10/09/2024    HPV,  Quadrivalent 07/05/2007, 09/07/2007    Hepatitis A vaccine, pediatric/adolescent (HAVRIX, VAQTA) 10/23/2007    Hepatitis B vaccine, 19 yrs and under (RECOMBIVAX, ENGERIX) 05/23/1996, 07/11/1996, 11/17/1996    Influenza, Unspecified 09/24/2009, 10/15/2022    Influenza, injectable, quadrivalent 10/18/2018, 09/30/2020    Influenza, seasonal, injectable 09/30/2015, 12/02/2016, 11/13/2019    Pfizer COVID-19 vaccine, bivalent, age 12 years and older (30 mcg/0.3 mL) 11/23/2022    Pfizer Gray Cap SARS-CoV-2 08/10/2022, 08/10/2022    Polio, Unspecified 1992, 1992, 04/13/1993, 11/18/1993, 02/20/1997    Tdap vaccine, age 7 year and older (BOOSTRIX, ADACEL) 08/13/2010, 11/01/2023                [1]   Past Medical History:  Diagnosis Date    Depression, unspecified 09/14/2022    Depression    Other conditions influencing health status     Nulliparity    Pain in unspecified ankle and joints of unspecified foot 10/15/2015    Ankle pain    Personal history of diseases of the skin and subcutaneous tissue     History of hyperpigmentation of skin    Personal history of other benign neoplasm     History of uterine fibroid    Personal history of other diseases of the circulatory system 12/19/2014    History of essential hypertension    Personal history of other diseases of the female genital tract 06/04/2013    History of vaginitis    Personal history of other specified conditions 07/20/2015    History of insomnia    Snoring     Snoring   [2]   Past Surgical History:  Procedure Laterality Date    ADENOIDECTOMY  01/27/2014    Adenoidectomy    APPENDECTOMY  01/27/2014    Appendectomy    TONSILLECTOMY  01/27/2014    Tonsillectomy   [3] No family history on file.  [4]   Allergies  Allergen Reactions    Bee Pollen Unknown    House Dust Unknown     Sneezing, stuffy nose   [5]   Current Outpatient Medications   Medication Sig Dispense Refill    amLODIPine (Norvasc) 10 mg tablet Take 1 tablet (10 mg) by mouth once daily. 90 tablet 3     Auvelity  mg tablet, IR and ER, biphasic Take 1 tablet by mouth once daily.      chlorthalidone (Hygroton) 25 mg tablet Take 1 tablet (25 mg) by mouth once daily. 90 tablet 3    cholecalciferol (Vitamin D-3) 5,000 Units tablet Take 1 tablet (5,000 Units) by mouth once daily. 30 tablet 2    clobetasol (Temovate) 0.05 % ointment Apply topically 2 times a day.      hydrOXYzine HCL (Atarax) 25 mg tablet Take 1 tablet (25 mg) by mouth once daily as needed for anxiety. 90 tablet 3    levothyroxine (Synthroid, Levoxyl) 50 mcg tablet Take 1 tablet (50 mcg) by mouth once daily. 90 tablet 3    nebivolol (Bystolic) 10 mg tablet Take 1 tablet (10 mg) by mouth once daily. 90 tablet 1    NON FORMULARY Ketamine infusion once monthly for depression (Patient not taking: Reported on 4/22/2025)       No current facility-administered medications for this visit.

## 2025-05-07 ENCOUNTER — HOSPITAL ENCOUNTER (OUTPATIENT)
Dept: RADIOLOGY | Facility: HOSPITAL | Age: 35
Discharge: HOME | End: 2025-05-07
Payer: COMMERCIAL

## 2025-05-07 DIAGNOSIS — R27.9 INCOORDINATION: ICD-10-CM

## 2025-05-07 DIAGNOSIS — R41.3 MEMORY IMPAIRMENT: ICD-10-CM

## 2025-05-07 PROCEDURE — 70551 MRI BRAIN STEM W/O DYE: CPT | Performed by: RADIOLOGY

## 2025-05-07 PROCEDURE — 70551 MRI BRAIN STEM W/O DYE: CPT

## 2025-05-11 DIAGNOSIS — I10 BENIGN ESSENTIAL HTN: ICD-10-CM

## 2025-05-11 RX ORDER — NEBIVOLOL 10 MG/1
10 TABLET ORAL DAILY
Qty: 90 TABLET | Refills: 0 | Status: SHIPPED | OUTPATIENT
Start: 2025-05-11

## 2025-05-22 NOTE — PROGRESS NOTES
Subjective   Patient ID: Devora Arce is a 35 y.o. female    HPI  35 y.o. female who presents to establish for renal cyst. Referred by her PCP Dr. Dangelo whom noted the patent reported some back pain that she thinks is related to her hx of kidney cysts. Happens on and off, not debilitating, she is able to ignore it and isn't overly concerned at this time. Renal US on 04/2025 showed a 12 x 10 x 12 mm round echogenic mass identified in the mid right renal cortex. This finding is compatible with angiomyolipoma described on the CT examination of 07/01/2019 though has increased in size in the interval. No hydronephrosis or shadowing calculus bilaterally.     Today, she denies any urinary symptoms such as urgency, frequency, hematuria, pain, burning, and infections.    The most recent Renal US, conducted on 04/14/2025, revealed:  12 x 10 x 12 mm round echogenic mass is identified in the mid right  renal cortex. This finding is compatible with angiomyolipoma  described on the CT examination of 07/01/2019 though has increased in  size in the interval.      No hydronephrosis or shadowing calculus bilaterally.      Normal urinary bladder.       Review of Systems    All systems were reviewed. Anything negative was noted in the HPI.    Objective   Physical Exam    General: Well developed, well nourished, alert and cooperative, appears in no acute distress   Eyes: Non-injected conjunctiva, sclera clear, no proptosis   Cardiac: Extremities are warm and well perfused. No edema, cyanosis or pallor   Lungs: Breathing is easy, non-labored. Speaking in clear and complete sentences. 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 Chronic left back pain     35 y.o. female who presents for the above condition, We had a very long and extensive discussion with the patient regarding the pathophysiology, differential diagnosis, risk factor, management, natural history, incidence and diagnostic work-up of the condition.      Plan:  - Follow-up in 6 months with Renal US    E&M visit today is associated with current or anticipated ongoing medical care services related to a patient's single, serious condition or a complex condition.     5/27/2025    Scribe Attestation  By signing my name below, ITavia Scribe attest that this documentation has been prepared under the direction and in the presence of Dr. Lorenzo Pichardo.          [1]   Past Medical History:  Diagnosis Date    Depression, unspecified 09/14/2022    Depression    Other conditions influencing health status     Nulliparity    Pain in unspecified ankle and joints of unspecified foot 10/15/2015    Ankle pain    Personal history of diseases of the skin and subcutaneous tissue     History of hyperpigmentation of skin    Personal history of other benign neoplasm     History of uterine fibroid    Personal history of other diseases of the circulatory system 12/19/2014    History of essential hypertension    Personal history of other diseases of the female genital tract 06/04/2013    History of vaginitis    Personal history of other specified conditions 07/20/2015    History of insomnia    Snoring     Snoring   [2]   Past Surgical History:  Procedure Laterality Date    ADENOIDECTOMY  01/27/2014    Adenoidectomy    APPENDECTOMY  01/27/2014    Appendectomy    TONSILLECTOMY  01/27/2014    Tonsillectomy

## 2025-05-27 ENCOUNTER — OFFICE VISIT (OUTPATIENT)
Dept: UROLOGY | Facility: HOSPITAL | Age: 35
End: 2025-05-27
Payer: COMMERCIAL

## 2025-05-27 DIAGNOSIS — N28.1 KIDNEY CYSTS: Primary | ICD-10-CM

## 2025-05-27 PROCEDURE — 99204 OFFICE O/P NEW MOD 45 MIN: CPT | Performed by: STUDENT IN AN ORGANIZED HEALTH CARE EDUCATION/TRAINING PROGRAM

## 2025-05-27 PROCEDURE — 99214 OFFICE O/P EST MOD 30 MIN: CPT | Performed by: STUDENT IN AN ORGANIZED HEALTH CARE EDUCATION/TRAINING PROGRAM

## 2025-05-29 ENCOUNTER — OFFICE VISIT (OUTPATIENT)
Dept: URGENT CARE | Age: 35
End: 2025-05-29
Payer: COMMERCIAL

## 2025-05-29 VITALS
OXYGEN SATURATION: 99 % | TEMPERATURE: 97.8 F | RESPIRATION RATE: 20 BRPM | DIASTOLIC BLOOD PRESSURE: 102 MMHG | HEART RATE: 80 BPM | SYSTOLIC BLOOD PRESSURE: 157 MMHG

## 2025-05-29 DIAGNOSIS — R82.90 ABNORMAL URINE FINDING: ICD-10-CM

## 2025-05-29 DIAGNOSIS — N92.6 ABNORMAL MENSTRUATION: ICD-10-CM

## 2025-05-29 DIAGNOSIS — I10 BENIGN ESSENTIAL HTN: Primary | ICD-10-CM

## 2025-05-29 LAB
POC APPEARANCE, URINE: ABNORMAL
POC BILIRUBIN, URINE: ABNORMAL
POC BLOOD, URINE: ABNORMAL
POC COLOR, URINE: ABNORMAL
POC GLUCOSE, URINE: ABNORMAL MG/DL
POC KETONES, URINE: ABNORMAL MG/DL
POC LEUKOCYTES, URINE: ABNORMAL
POC NITRITE,URINE: POSITIVE
POC PH, URINE: 5 PH
POC PROTEIN, URINE: ABNORMAL MG/DL
POC SPECIFIC GRAVITY, URINE: 1.02
POC UROBILINOGEN, URINE: >=8 EU/DL
PREGNANCY TEST URINE, POC: NEGATIVE

## 2025-05-29 ASSESSMENT — PAIN SCALES - GENERAL: PAINLEVEL_OUTOF10: 7

## 2025-05-29 NOTE — PROGRESS NOTES
HPI:  Patient states that she has hx/o abnormal menses and fibroids and had myomectomy in the past.  Pt states that 2 years ago her previous gynecologist told her that there is another fibroid growing.  Pt states that her gynecologist retired and she has appt with a new one at the end of next month.  Pt states that she had no cycle in March and April and had one since 5/9/25. Pt states that cycle stopped for about 4 days with only light spotting, but then started again.  Pt stats that she had some small clots.  Using between 3-5 pads a day.  NO AP.  No n/v.  No CP.  No SOB.  No dizziness.         ROS:  No CP  No SOB  No dizziness  No new fatigue  No dysuria  Some urgency    PE:    A&O x3  NCAT  PERRLA, EOMI  TM clear bl  No pharyngeal erythema  RRR  CTAB  Abd:soft, nd, nt,+bs, no cva tndop  MOEx4  No focal deficit  Judgement normal    Results:  UA dip: likely inaccurate with everything+ secondary to large blood in the sample  Urine preg: negative    A/P:   1.Abnormal menstruation  Follow up with gynecologist as scheduled; try to get earlier appt.  Referral was placed for urgent visit.  Increase iron rich food.  Take multivitamin with iron.  Go to the ER if starts getting worse.      2.Abnormal urine finding  Increase fluids.  We will call you with test results if you need further treatment.  Follow up with your doctor in 4-5 days if no improvement.  Go to the ER if starts getting worse.       3.Elevated blood pressure reading  Monitor blood pressure at home and recheck with your doctor if remains elevated.

## 2025-05-31 LAB — BACTERIA UR CULT: ABNORMAL

## 2025-06-03 ENCOUNTER — TELEPHONE (OUTPATIENT)
Dept: URGENT CARE | Age: 35
End: 2025-06-03

## 2025-06-03 DIAGNOSIS — B95.1 GROUP B STREPTOCOCCAL UTI: Primary | ICD-10-CM

## 2025-06-03 DIAGNOSIS — N39.0 GROUP B STREPTOCOCCAL UTI: Primary | ICD-10-CM

## 2025-06-03 RX ORDER — AMOXICILLIN 500 MG/1
500 CAPSULE ORAL EVERY 12 HOURS SCHEDULED
Qty: 14 CAPSULE | Refills: 0 | Status: SHIPPED | OUTPATIENT
Start: 2025-06-03 | End: 2025-06-10

## 2025-06-03 NOTE — TELEPHONE ENCOUNTER
Positive group b strep UTI, sending in amoxicillin    Confirmed no allergies to any medications over phone    Sent to [facility].    Advised tor return to clinic if no improvement of symptoms.

## 2025-06-10 ENCOUNTER — APPOINTMENT (OUTPATIENT)
Facility: CLINIC | Age: 35
End: 2025-06-10
Payer: COMMERCIAL

## 2025-06-10 VITALS
BODY MASS INDEX: 41.22 KG/M2 | WEIGHT: 224 LBS | SYSTOLIC BLOOD PRESSURE: 147 MMHG | HEIGHT: 62 IN | DIASTOLIC BLOOD PRESSURE: 98 MMHG

## 2025-06-10 DIAGNOSIS — N92.6 ABNORMAL MENSTRUATION: ICD-10-CM

## 2025-06-10 DIAGNOSIS — N93.9 ABNORMAL UTERINE BLEEDING (AUB): Primary | ICD-10-CM

## 2025-06-10 PROCEDURE — 3077F SYST BP >= 140 MM HG: CPT | Performed by: STUDENT IN AN ORGANIZED HEALTH CARE EDUCATION/TRAINING PROGRAM

## 2025-06-10 PROCEDURE — 1036F TOBACCO NON-USER: CPT | Performed by: STUDENT IN AN ORGANIZED HEALTH CARE EDUCATION/TRAINING PROGRAM

## 2025-06-10 PROCEDURE — 3080F DIAST BP >= 90 MM HG: CPT | Performed by: STUDENT IN AN ORGANIZED HEALTH CARE EDUCATION/TRAINING PROGRAM

## 2025-06-10 PROCEDURE — 3008F BODY MASS INDEX DOCD: CPT | Performed by: STUDENT IN AN ORGANIZED HEALTH CARE EDUCATION/TRAINING PROGRAM

## 2025-06-10 PROCEDURE — 99203 OFFICE O/P NEW LOW 30 MIN: CPT | Performed by: STUDENT IN AN ORGANIZED HEALTH CARE EDUCATION/TRAINING PROGRAM

## 2025-06-10 ASSESSMENT — ENCOUNTER SYMPTOMS
OCCASIONAL FEELINGS OF UNSTEADINESS: 0
LOSS OF SENSATION IN FEET: 0
DEPRESSION: 0

## 2025-06-10 ASSESSMENT — COLUMBIA-SUICIDE SEVERITY RATING SCALE - C-SSRS
6. HAVE YOU EVER DONE ANYTHING, STARTED TO DO ANYTHING, OR PREPARED TO DO ANYTHING TO END YOUR LIFE?: NO
1. IN THE PAST MONTH, HAVE YOU WISHED YOU WERE DEAD OR WISHED YOU COULD GO TO SLEEP AND NOT WAKE UP?: NO
2. HAVE YOU ACTUALLY HAD ANY THOUGHTS OF KILLING YOURSELF?: NO

## 2025-06-10 ASSESSMENT — PATIENT HEALTH QUESTIONNAIRE - PHQ9
2. FEELING DOWN, DEPRESSED OR HOPELESS: NOT AT ALL
SUM OF ALL RESPONSES TO PHQ9 QUESTIONS 1 AND 2: 0
1. LITTLE INTEREST OR PLEASURE IN DOING THINGS: NOT AT ALL

## 2025-06-10 NOTE — PROGRESS NOTES
Subjective   Devora Arce is a 35 y.o.  who presents today for AUB.    Had no period in March and April, and then May 9, started with a very heavy period and continued to bleed until about yesterday. Still spotting today.     Prior to this, had regular monthly periods. Usually only heavy first few days- needing 3 pads over the course of day and double pads at night.     No changes of medication recently. Considering nexplanon for menstrual suppression. Had tried birth control in past for bleeding suppression    ObHx: G0  GynHx:   Not currently sexually active  PMH: HTN  PSH: myomectomy in , appendectomy  Shx: rare etoh, no tobacco use, no drug use  Lives with mother currently  Works as registration for ED   FamHx: father had stroke history     Has history of early trauma with speculum exam at age 19 - has had difficulty and anxiety around pelvic exams since then.    Objective   Physical Exam  Vitals:    06/10/25 1259   BP: (!) 147/98      Gen: awake, alert  Head: NCAT  HEENT: moist mucus membranes  Pulm: breathing comfortably on room air  CV: warm and well-perfused  Abd: soft non distended  : deferred per pt requet  Neuro: alert and oriented  Psych: appropriate affect     Assessment/Plan     Devora Arce is a 35 y.o.  who presents today for AUB.    - Shared decision making to defer pelvic exam in context of trauma history. We discussed strategies for pelvic exam in the context of her history, and will trial Ativan.  - Pelvic US ordered  - Labs ordered  - Discussed follow up for pap and Nexplanon placement- reviewed options for menstrual suppression and bleeding profile of nexplanon    Harini Wood MD

## 2025-06-17 ENCOUNTER — HOSPITAL ENCOUNTER (OUTPATIENT)
Dept: RADIOLOGY | Facility: CLINIC | Age: 35
Discharge: HOME | End: 2025-06-17
Payer: COMMERCIAL

## 2025-06-17 DIAGNOSIS — N93.9 ABNORMAL UTERINE BLEEDING (AUB): ICD-10-CM

## 2025-06-17 PROCEDURE — 76857 US EXAM PELVIC LIMITED: CPT | Performed by: STUDENT IN AN ORGANIZED HEALTH CARE EDUCATION/TRAINING PROGRAM

## 2025-06-17 PROCEDURE — 76856 US EXAM PELVIC COMPLETE: CPT

## 2025-06-18 ENCOUNTER — TELEMEDICINE (OUTPATIENT)
Dept: PRIMARY CARE | Facility: CLINIC | Age: 35
End: 2025-06-18
Payer: COMMERCIAL

## 2025-06-18 DIAGNOSIS — F32.A DEPRESSION, UNSPECIFIED DEPRESSION TYPE: ICD-10-CM

## 2025-06-18 DIAGNOSIS — F41.9 ANXIETY: ICD-10-CM

## 2025-06-18 DIAGNOSIS — I15.9 SECONDARY HYPERTENSION: Primary | ICD-10-CM

## 2025-06-18 PROCEDURE — 99214 OFFICE O/P EST MOD 30 MIN: CPT | Performed by: INTERNAL MEDICINE

## 2025-06-18 ASSESSMENT — ENCOUNTER SYMPTOMS
MUSCLE TENSION: 1
PANIC: 1
NERVOUS/ANXIOUS: 1
FEELING OF CHOKING: 1
DECREASED CONCENTRATION: 1
HYPERVENTILATION: 1

## 2025-06-18 NOTE — PROGRESS NOTES
Chief Complaint:   Chief Complaint   Patient presents with    Anxiety        Devora Arce is a 35 y.o. year old female who presents to the clinic for a virtual visit.  Anxiety  Presents for follow-up visit. Symptoms include decreased concentration, excessive worry, feeling of choking, hyperventilation, muscle tension, nervous/anxious behavior and panic. Symptoms occur occasionally. The severity of symptoms is severe. The quality of sleep is non-restorative. Nighttime awakenings: occasional.            Past Medical History   Medical History[1]   Past Surgical History:   Surgical History[2]  Family History:   Family History[3]  Social History:   Tobacco Use: Low Risk  (6/10/2025)    Patient History     Smoking Tobacco Use: Never     Smokeless Tobacco Use: Never     Passive Exposure: Past      Social History     Substance and Sexual Activity   Alcohol Use Never        Allergies:   RX Allergies[4]     ROS   Review of Systems   Psychiatric/Behavioral:  Positive for decreased concentration. The patient is nervous/anxious.         Objective   There were no vitals filed for this visit.   BMI Readings from Last 15 Encounters:   06/10/25 40.97 kg/m²   04/22/25 38.96 kg/m²   04/22/25 40.24 kg/m²   04/04/25 38.59 kg/m²   02/25/25 38.04 kg/m²   01/03/25 37.13 kg/m²   10/30/24 37.86 kg/m²   10/09/24 37.68 kg/m²   06/04/24 38.23 kg/m²   03/27/24 38.41 kg/m²   02/07/24 39.14 kg/m²   11/13/23 38.96 kg/m²   11/01/23 38.41 kg/m²   08/25/23 41.34 kg/m²   07/26/23 39.31 kg/m²      102 kg (224 lb) (6/10/2025 12:59 PM)      Physical Exam  Physical Exam  Neurological:      Mental Status: She is alert.   Psychiatric:         Mood and Affect: Mood normal.          Labs:  CBC:  Recent Labs     01/03/25  1420 06/04/24  1558 11/01/23  1136   WBC 5.8 7.2 5.6   HGB 12.6 11.3* 11.9*   HCT 39.6 35.3* 39.8    375 484*   MCV 82 81 83     CMP:  Recent Labs     01/03/25  1420 06/04/24  1558 02/07/24  1223    140 141   K 3.9 4.0 3.6   CL  "103 104 100   CO2 27 28 30   ANIONGAP 13 12 15   BUN 11 13 20   CREATININE 0.82 0.98 0.93   EGFR >90 79 84   GLUCOSE 90 97 87     Recent Labs     01/03/25  1420 06/04/24  1558 02/07/24  1223   ALBUMIN 4.0 3.8 4.0   ALKPHOS 72 69 64   ALT 13 10 13   AST 13 13 14   BILITOT 0.4 0.3 0.3     Calcium/Phos:   Lab Results   Component Value Date    CALCIUM 9.2 01/03/2025      COAG: No results for input(s): \"INR\", \"DDIMERVTE\" in the last 60622 hours.  CRP: No results found for: \"CRP\"   [unfilled]   ENDO:  Recent Labs     01/03/25  1420 06/04/24  1558 11/01/23  1136 04/11/23  1503 09/28/22  1058   TSH 2.28 0.82  --  0.75 3.15   HGBA1C 5.5 5.4 5.5 5.7* 5.7*      CARDIAC: No results for input(s): \"LDH\", \"CKMB\", \"TROPHS\", \"BNP\" in the last 22867 hours.    No lab exists for component: \"CK\", \"CKMBP\"  Recent Labs     01/03/25  1420 06/04/24  1558 04/11/23  1503 09/28/22  1058 07/05/22  1152   CHOL 189 164 177 168 152   LDLF  --   --  97 90 77   LDLCALC 104* 83  --   --   --    HDL 70.5 58.1 70.2 65.1 62.7   TRIG 71 116 51 67 61     No data recorded    Current Medications:  Current Medications[5]    Assessment and Plan  Devora was seen today for anxiety.  Diagnoses and all orders for this visit:  Secondary hypertension (Primary)  Depression, unspecified depression type  Anxiety     Episodes of severe anxiety around certain social situations, mostl related to her family.  She gets extremely tense, develops headaches and eye pressure and the only way this gets better is if she screams loud. She goes to isolated places and screams to help her feel better.  She has mentioned it to her therapist, but was told to reach out to us.  She does see a psychiatrist and I will try to reach out to her for guidance.  Monitor BP at home BID, especially during those episodes.  Try deep breathing techniques.  She may benefit form PRN anxiolytics.  Psych person is  Dr. Duffy  Phone [phone], left a message  Email:   " [email or web address], email sent    Spoke with Dr. Duffy  She will contact the patient    An interactive audio and video telecommunication system which permits real time communications between the patient (at the originating site) and provider (at the distant site) was utilized to provide this telehealth service.    Verbal consent was requested and obtained from the patient on the day of encounter.  This is a virtual visit using HIPAA compliant video platform. It required patient-provider interaction for the medical decision making as documented.     I have communicated my name and active licensure. The patient's identity and physical location were verified at the time of this visit.   Either the patient or their legal representative has been informed of the risks and benefits of -- and alternatives to -- treatment through a remote evaluation and consents to proceed with the evaluation remotely.      Immunizations:  Immunization History   Administered Date(s) Administered    COVID-19, mRNA, LNP-S, PF, 30 mcg/0.3 mL dose 04/06/2021, 04/28/2021, 12/03/2021    Flu vaccine (IIV4), preservative free *Check age/dose* 10/08/2021    Flu vaccine, quadrivalent, no egg protein, age 6 month or greater (FLUCELVAX) 10/18/2018, 09/30/2020, 11/11/2022    Flu vaccine, trivalent, preservative free, age 6 months and greater (Fluarix/Fluzone/Flulaval) 10/09/2024    HPV, Quadrivalent 07/05/2007, 09/07/2007    Hepatitis A vaccine, pediatric/adolescent (HAVRIX, VAQTA) 10/23/2007    Hepatitis B vaccine, 19 yrs and under (RECOMBIVAX, ENGERIX) 05/23/1996, 07/11/1996, 11/17/1996    Influenza, Unspecified 09/24/2009, 10/15/2022    Influenza, injectable, quadrivalent 10/18/2018, 09/30/2020    Influenza, seasonal, injectable 09/30/2015, 12/02/2016, 11/13/2019    Pfizer COVID-19 vaccine, bivalent, age 12 years and older (30 mcg/0.3 mL) 11/23/2022    Pfizer Gray Cap SARS-CoV-2 08/10/2022, 08/10/2022    Polio, Unspecified 1992,  1992, 04/13/1993, 11/18/1993, 02/20/1997    Tdap vaccine, age 7 year and older (BOOSTRIX, ADACEL) 08/13/2010, 11/01/2023                [1]   Past Medical History:  Diagnosis Date    Arrhythmia 2006    Depression, unspecified 09/14/2022    Depression    Other conditions influencing health status     Nulliparity    Pain in unspecified ankle and joints of unspecified foot 10/15/2015    Ankle pain    Personal history of diseases of the skin and subcutaneous tissue     History of hyperpigmentation of skin    Personal history of other benign neoplasm     History of uterine fibroid    Personal history of other diseases of the circulatory system 12/19/2014    History of essential hypertension    Personal history of other diseases of the female genital tract 06/04/2013    History of vaginitis    Personal history of other specified conditions 07/20/2015    History of insomnia    Snoring     Snoring   [2]   Past Surgical History:  Procedure Laterality Date    ADENOIDECTOMY  01/27/2014    Adenoidectomy    APPENDECTOMY  01/27/2014    Appendectomy    TONSILLECTOMY  01/27/2014    Tonsillectomy   [3]   Family History  Problem Relation Name Age of Onset    Hypertension Mother Marley     Diabetes type II Father Andry     Hypertension Father Andry     Stroke Father Andry     Cancer Maternal Grandmother Rosalee     Cancer Father's Sister Oldest Sister     Cancer Father's Sister Oldest Sister    [4]   Allergies  Allergen Reactions    Bee Pollen Unknown    House Dust Unknown     Sneezing, stuffy nose   [5]   Current Outpatient Medications   Medication Sig Dispense Refill    amLODIPine (Norvasc) 10 mg tablet Take 1 tablet (10 mg) by mouth once daily. 90 tablet 3    Auvelity  mg tablet, IR and ER, biphasic Take 1 tablet by mouth once daily.      chlorthalidone (Hygroton) 25 mg tablet Take 1 tablet (25 mg) by mouth once daily. 90 tablet 3    cholecalciferol (Vitamin D-3) 5,000 Units tablet Take 1 tablet (5,000 Units) by mouth  once daily. 30 tablet 2    clobetasol (Temovate) 0.05 % ointment Apply topically 2 times a day.      hydrOXYzine HCL (Atarax) 25 mg tablet Take 1 tablet (25 mg) by mouth once daily as needed for anxiety. 90 tablet 3    levothyroxine (Synthroid, Levoxyl) 50 mcg tablet Take 1 tablet (50 mcg) by mouth once daily. 90 tablet 3    nebivolol (Bystolic) 10 mg tablet TAKE ONE TABLET BY MOUTH EVERY DAY 90 tablet 0    NON FORMULARY Ketamine infusion once monthly for depression (Patient not taking: Reported on 4/22/2025)       No current facility-administered medications for this visit.

## 2025-07-08 ENCOUNTER — APPOINTMENT (OUTPATIENT)
Facility: CLINIC | Age: 35
End: 2025-07-08
Payer: COMMERCIAL

## 2025-07-17 ENCOUNTER — APPOINTMENT (OUTPATIENT)
Facility: CLINIC | Age: 35
End: 2025-07-17
Payer: COMMERCIAL

## 2025-07-25 ENCOUNTER — APPOINTMENT (OUTPATIENT)
Dept: PRIMARY CARE | Facility: CLINIC | Age: 35
End: 2025-07-25
Payer: COMMERCIAL

## 2025-08-01 ENCOUNTER — APPOINTMENT (OUTPATIENT)
Dept: PRIMARY CARE | Facility: CLINIC | Age: 35
End: 2025-08-01
Payer: COMMERCIAL

## 2025-08-01 VITALS
DIASTOLIC BLOOD PRESSURE: 85 MMHG | BODY MASS INDEX: 40.12 KG/M2 | SYSTOLIC BLOOD PRESSURE: 138 MMHG | HEIGHT: 62 IN | WEIGHT: 218 LBS | HEART RATE: 77 BPM

## 2025-08-01 DIAGNOSIS — I15.9 SECONDARY HYPERTENSION: Primary | ICD-10-CM

## 2025-08-01 DIAGNOSIS — I10 PRIMARY HYPERTENSION: ICD-10-CM

## 2025-08-01 DIAGNOSIS — E03.9 PRIMARY HYPOTHYROIDISM: ICD-10-CM

## 2025-08-01 PROCEDURE — 3075F SYST BP GE 130 - 139MM HG: CPT | Performed by: INTERNAL MEDICINE

## 2025-08-01 PROCEDURE — 1036F TOBACCO NON-USER: CPT | Performed by: INTERNAL MEDICINE

## 2025-08-01 PROCEDURE — 99214 OFFICE O/P EST MOD 30 MIN: CPT | Performed by: INTERNAL MEDICINE

## 2025-08-01 PROCEDURE — 3079F DIAST BP 80-89 MM HG: CPT | Performed by: INTERNAL MEDICINE

## 2025-08-01 PROCEDURE — 3008F BODY MASS INDEX DOCD: CPT | Performed by: INTERNAL MEDICINE

## 2025-08-01 RX ORDER — LEVOTHYROXINE SODIUM 50 UG/1
50 TABLET ORAL DAILY
Qty: 90 TABLET | Refills: 3 | Status: SHIPPED | OUTPATIENT
Start: 2025-08-01

## 2025-08-01 ASSESSMENT — PATIENT HEALTH QUESTIONNAIRE - PHQ9
SUM OF ALL RESPONSES TO PHQ QUESTIONS 1-9: 14
3. TROUBLE FALLING OR STAYING ASLEEP OR SLEEPING TOO MUCH: NEARLY EVERY DAY
2. FEELING DOWN, DEPRESSED OR HOPELESS: NEARLY EVERY DAY
SUM OF ALL RESPONSES TO PHQ9 QUESTIONS 1 AND 2: 3
7. TROUBLE CONCENTRATING ON THINGS, SUCH AS READING THE NEWSPAPER OR WATCHING TELEVISION: SEVERAL DAYS
4. FEELING TIRED OR HAVING LITTLE ENERGY: SEVERAL DAYS
6. FEELING BAD ABOUT YOURSELF - OR THAT YOU ARE A FAILURE OR HAVE LET YOURSELF OR YOUR FAMILY DOWN: SEVERAL DAYS
1. LITTLE INTEREST OR PLEASURE IN DOING THINGS: NOT AT ALL
9. THOUGHTS THAT YOU WOULD BE BETTER OFF DEAD, OR OF HURTING YOURSELF: SEVERAL DAYS
8. MOVING OR SPEAKING SO SLOWLY THAT OTHER PEOPLE COULD HAVE NOTICED. OR THE OPPOSITE, BEING SO FIGETY OR RESTLESS THAT YOU HAVE BEEN MOVING AROUND A LOT MORE THAN USUAL: SEVERAL DAYS
5. POOR APPETITE OR OVEREATING: NEARLY EVERY DAY

## 2025-08-01 ASSESSMENT — ENCOUNTER SYMPTOMS
OCCASIONAL FEELINGS OF UNSTEADINESS: 0
ABDOMINAL PAIN: 0
DEPRESSION: 0
LOSS OF SENSATION IN FEET: 0
SHORTNESS OF BREATH: 0

## 2025-08-01 NOTE — PROGRESS NOTES
"Subjective   Patient ID: Devora Arce is a 35 y.o. female who presents for Follow-up.    HPI   Devora Arce is a 35 y.o. female with pmhx HTN, depression/anxiety, obesity, hypothyroidism, vitamin D deficiency who presents for follow-up.    36 y/o FM with significant PMHX presents today for a follow up visit. Pt reports she is feeling anxious 2/2 living at home. She reports she has problems with her mother and tries to avoid mother by staying outside of home. This has caused her to start eating at fast food restaurants often. She reports she has thought about meal prepping; however, she feels this will cause a topic for argument with her mother. She states they already argue over shared space.  Pt is not as physically active as she used to be. Now, she exercises twice a week. Pt reports she is not taking hydroxyzine 2/2 fear of becoming dependent. She reports she follows care with psychiatry and therapy; however, has to pay out of pocket so becomes expensive for her. Pt reports BP at home is controlled at 130's/80's average. Pt's long term plan is to graduate from college and either move to [place] or near family in [place].     Review of Systems   Respiratory:  Negative for shortness of breath.    Cardiovascular:  Negative for chest pain and leg swelling.   Gastrointestinal:  Negative for abdominal pain.   All other systems reviewed and are negative.      Objective   /85 (BP Location: Left arm, Patient Position: Sitting, BP Cuff Size: Adult)   Pulse 77   Ht 1.575 m (5' 2\")   Wt 98.9 kg (218 lb)   BMI 39.87 kg/m²     Physical Exam  Vitals and nursing note reviewed.   Constitutional:       Appearance: Normal appearance.     Cardiovascular:      Rate and Rhythm: Normal rate and regular rhythm.   Pulmonary:      Effort: Pulmonary effort is normal.      Breath sounds: Normal breath sounds.   Abdominal:      Palpations: Abdomen is soft.     Neurological:      Mental Status: She is alert.     Psychiatric:   "       Mood and Affect: Mood normal.         Behavior: Behavior normal.         Assessment/Plan   Problem List Items Addressed This Visit       Hypertension - Primary    Relevant Orders    Comprehensive Metabolic Panel    Albumin-Creatinine Ratio, Urine Random    Primary hypothyroidism    Relevant Medications    levothyroxine (Synthroid, Levoxyl) 50 mcg tablet       Devora Arce is a 35 y.o. female with pmhx HTN, depression/anxiety, obesity, hypothyroidism, vitamin D deficiency who presents for follow-up.    #HTN: 138/85  - Pt is compliant with Amlodipine and Chlorthalidone  - She reports BP is on avg 130s/80s at home   - CMP, urine albumin ordered today  - WC     #anxiety   - Pt reports chronic anxiety 2/2 living conditions   - She states she is not taking hydroxyzine 2/2 fear of becoming dependent   - Advised pt to start taking Hydroxyzine as prescribed until she has a better control on her stressors  - Pt voiced understand and will start taking medication    #Obesity   - BMI: 39.87, Wt: 218  - Pt reports she is not following a healthy diet. She finds herself eating out often and has decreased exercise   - Encouraged pt to start meal prepping and trying to incorporate healthier foods including high protein low carbohydrate foods   - Pt voiced understanding   - WCTM

## 2025-08-05 ENCOUNTER — APPOINTMENT (OUTPATIENT)
Dept: PRIMARY CARE | Facility: CLINIC | Age: 35
End: 2025-08-05
Payer: COMMERCIAL

## 2025-08-11 ENCOUNTER — APPOINTMENT (OUTPATIENT)
Facility: CLINIC | Age: 35
End: 2025-08-11
Payer: COMMERCIAL

## 2025-08-18 ENCOUNTER — APPOINTMENT (OUTPATIENT)
Facility: CLINIC | Age: 35
End: 2025-08-18
Payer: COMMERCIAL

## 2025-08-18 VITALS
BODY MASS INDEX: 41.22 KG/M2 | WEIGHT: 224 LBS | HEIGHT: 62 IN | SYSTOLIC BLOOD PRESSURE: 141 MMHG | DIASTOLIC BLOOD PRESSURE: 98 MMHG

## 2025-08-18 DIAGNOSIS — N93.9 ABNORMAL UTERINE BLEEDING (AUB): ICD-10-CM

## 2025-08-18 DIAGNOSIS — F41.9 ANXIETY DUE TO INVASIVE PROCEDURE: Primary | ICD-10-CM

## 2025-08-18 RX ORDER — LORAZEPAM 0.5 MG/1
0.5 TABLET ORAL ONCE
Qty: 1 TABLET | Refills: 0 | Status: SHIPPED | OUTPATIENT
Start: 2025-08-18 | End: 2025-08-18

## 2025-08-18 ASSESSMENT — ENCOUNTER SYMPTOMS
LOSS OF SENSATION IN FEET: 0
OCCASIONAL FEELINGS OF UNSTEADINESS: 0
DEPRESSION: 0

## 2025-08-18 ASSESSMENT — COLUMBIA-SUICIDE SEVERITY RATING SCALE - C-SSRS
6. HAVE YOU EVER DONE ANYTHING, STARTED TO DO ANYTHING, OR PREPARED TO DO ANYTHING TO END YOUR LIFE?: NO
2. HAVE YOU ACTUALLY HAD ANY THOUGHTS OF KILLING YOURSELF?: NO
1. IN THE PAST MONTH, HAVE YOU WISHED YOU WERE DEAD OR WISHED YOU COULD GO TO SLEEP AND NOT WAKE UP?: NO

## 2025-09-15 ENCOUNTER — APPOINTMENT (OUTPATIENT)
Facility: CLINIC | Age: 35
End: 2025-09-15
Payer: COMMERCIAL

## 2025-10-07 ENCOUNTER — APPOINTMENT (OUTPATIENT)
Dept: BEHAVIORAL HEALTH | Facility: CLINIC | Age: 35
End: 2025-10-07
Payer: COMMERCIAL

## 2025-10-10 ENCOUNTER — APPOINTMENT (OUTPATIENT)
Dept: BEHAVIORAL HEALTH | Facility: CLINIC | Age: 35
End: 2025-10-10
Payer: COMMERCIAL

## 2025-11-21 ENCOUNTER — APPOINTMENT (OUTPATIENT)
Dept: PRIMARY CARE | Facility: CLINIC | Age: 35
End: 2025-11-21
Payer: COMMERCIAL

## 2025-12-02 ENCOUNTER — APPOINTMENT (OUTPATIENT)
Dept: UROLOGY | Facility: HOSPITAL | Age: 35
End: 2025-12-02
Payer: COMMERCIAL